# Patient Record
Sex: MALE | Race: WHITE | Employment: FULL TIME | URBAN - METROPOLITAN AREA
[De-identification: names, ages, dates, MRNs, and addresses within clinical notes are randomized per-mention and may not be internally consistent; named-entity substitution may affect disease eponyms.]

---

## 2017-09-28 ENCOUNTER — OFFICE VISIT (OUTPATIENT)
Dept: FAMILY MEDICINE | Facility: CLINIC | Age: 57
End: 2017-09-28
Payer: COMMERCIAL

## 2017-09-28 VITALS
HEIGHT: 73 IN | DIASTOLIC BLOOD PRESSURE: 81 MMHG | WEIGHT: 220 LBS | BODY MASS INDEX: 29.16 KG/M2 | SYSTOLIC BLOOD PRESSURE: 115 MMHG | RESPIRATION RATE: 18 BRPM | HEART RATE: 60 BPM | OXYGEN SATURATION: 97 % | TEMPERATURE: 98 F

## 2017-09-28 DIAGNOSIS — Z00.00 ROUTINE GENERAL MEDICAL EXAMINATION AT A HEALTH CARE FACILITY: Primary | ICD-10-CM

## 2017-09-28 DIAGNOSIS — Z12.5 SCREENING FOR PROSTATE CANCER: ICD-10-CM

## 2017-09-28 DIAGNOSIS — R73.01 IMPAIRED FASTING GLUCOSE: ICD-10-CM

## 2017-09-28 DIAGNOSIS — E66.3 OVERWEIGHT: ICD-10-CM

## 2017-09-28 LAB
ALBUMIN SERPL-MCNC: 4.1 G/DL (ref 3.4–5)
ALP SERPL-CCNC: 74 U/L (ref 40–150)
ALT SERPL W P-5'-P-CCNC: 55 U/L (ref 0–70)
ANION GAP SERPL CALCULATED.3IONS-SCNC: 9 MMOL/L (ref 3–14)
AST SERPL W P-5'-P-CCNC: 39 U/L (ref 0–45)
BILIRUB SERPL-MCNC: 0.9 MG/DL (ref 0.2–1.3)
BUN SERPL-MCNC: 22 MG/DL (ref 7–30)
CALCIUM SERPL-MCNC: 9.1 MG/DL (ref 8.5–10.1)
CHLORIDE SERPL-SCNC: 104 MMOL/L (ref 94–109)
CHOLEST SERPL-MCNC: 170 MG/DL
CO2 SERPL-SCNC: 25 MMOL/L (ref 20–32)
CREAT SERPL-MCNC: 0.77 MG/DL (ref 0.66–1.25)
ERYTHROCYTE [DISTWIDTH] IN BLOOD BY AUTOMATED COUNT: 12.4 % (ref 10–15)
GFR SERPL CREATININE-BSD FRML MDRD: >90 ML/MIN/1.7M2
GLUCOSE SERPL-MCNC: 244 MG/DL (ref 70–99)
HCT VFR BLD AUTO: 44.2 % (ref 40–53)
HDLC SERPL-MCNC: 44 MG/DL
HGB BLD-MCNC: 16 G/DL (ref 13.3–17.7)
LDLC SERPL CALC-MCNC: 103 MG/DL
MCH RBC QN AUTO: 32.4 PG (ref 26.5–33)
MCHC RBC AUTO-ENTMCNC: 36.2 G/DL (ref 31.5–36.5)
MCV RBC AUTO: 90 FL (ref 78–100)
NONHDLC SERPL-MCNC: 126 MG/DL
PLATELET # BLD AUTO: 174 10E9/L (ref 150–450)
POTASSIUM SERPL-SCNC: 4.1 MMOL/L (ref 3.4–5.3)
PROT SERPL-MCNC: 7.3 G/DL (ref 6.8–8.8)
PSA SERPL-ACNC: 0.91 UG/L (ref 0–4)
RBC # BLD AUTO: 4.94 10E12/L (ref 4.4–5.9)
SODIUM SERPL-SCNC: 138 MMOL/L (ref 133–144)
TRIGL SERPL-MCNC: 117 MG/DL
WBC # BLD AUTO: 3.8 10E9/L (ref 4–11)

## 2017-09-28 PROCEDURE — 99396 PREV VISIT EST AGE 40-64: CPT | Performed by: INTERNAL MEDICINE

## 2017-09-28 PROCEDURE — 85027 COMPLETE CBC AUTOMATED: CPT | Performed by: INTERNAL MEDICINE

## 2017-09-28 PROCEDURE — G0103 PSA SCREENING: HCPCS | Performed by: INTERNAL MEDICINE

## 2017-09-28 PROCEDURE — 36415 COLL VENOUS BLD VENIPUNCTURE: CPT | Performed by: INTERNAL MEDICINE

## 2017-09-28 PROCEDURE — 80061 LIPID PANEL: CPT | Performed by: INTERNAL MEDICINE

## 2017-09-28 PROCEDURE — 80053 COMPREHEN METABOLIC PANEL: CPT | Performed by: INTERNAL MEDICINE

## 2017-09-28 NOTE — NURSING NOTE
"Chief Complaint   Patient presents with     Physical     Not Fasting       Initial /81 (BP Location: Right arm, Patient Position: Chair, Cuff Size: Adult Large)  Pulse 60  Temp 98  F (36.7  C) (Oral)  Resp 18  Ht 6' 1\" (1.854 m)  Wt 220 lb (99.8 kg)  SpO2 97%  BMI 29.03 kg/m2 Estimated body mass index is 29.03 kg/(m^2) as calculated from the following:    Height as of this encounter: 6' 1\" (1.854 m).    Weight as of this encounter: 220 lb (99.8 kg).  Medication Reconciliation: complete   Rylie Bennett CMA (AAMA)      "

## 2017-09-28 NOTE — PROGRESS NOTES
SUBJECTIVE:   CC: Joey Santillan is an 57 year old male who presents for preventative health visit.     Physical   Annual:     Getting at least 3 servings of Calcium per day::  Yes    Bi-annual eye exam::  NO    Dental care twice a year::  Yes    Sleep apnea or symptoms of sleep apnea::  None    Diet::  Regular (no restrictions)    Frequency of exercise::  6-7 days/week    Duration of exercise::  Less than 15 minutes    Taking medications regularly::  Not Applicable    Additional concerns today::  No        Today's PHQ-2 Score:   PHQ-2 ( 1999 [employer]) 9/28/2017   Q1: Little interest or pleasure in doing things 0   Q2: Feeling down, depressed or hopeless 0   PHQ-2 Score 0   Q1: Little interest or pleasure in doing things -   Q2: Feeling down, depressed or hopeless -   PHQ-2 Score -       Abuse: Current or Past(Physical, Sexual or Emotional)- No  Do you feel safe in your environment - Yes    Social History   Substance Use Topics     Smoking status: Current Some Day Smoker     Types: Cigars     Smokeless tobacco: Never Used      Comment: Cigar twice a month     Alcohol use Yes      Comment: 0-4 drinks per week     The patient does not drink >3 drinks per day nor >7 drinks per week.    Last PSA:   PSA   Date Value Ref Range Status   08/31/2015 0.88 0 - 4 ug/L Final       Reviewed orders with patient. Reviewed health maintenance and updated orders accordingly - Yes  Patient Active Problem List   Diagnosis     CARDIOVASCULAR SCREENING; LDL GOAL LESS THAN 160     Impaired fasting glucose     Overweight     History reviewed. No pertinent surgical history.    Social History   Substance Use Topics     Smoking status: Current Some Day Smoker     Types: Cigars     Smokeless tobacco: Never Used      Comment: Cigar twice a month     Alcohol use Yes      Comment: 0-4 drinks per week     Family History   Problem Relation Age of Onset     C.A.D. Father      DIABETES Mother      Alzheimer Disease Paternal Grandmother       "Arthritis Maternal Grandmother      Cancer - colorectal No family hx of      Prostate Cancer No family hx of          Current Outpatient Prescriptions   Medication Sig Dispense Refill     nystatin (MYCOSTATIN) 823930 UNIT/GM POWD Apply 2 times per day for 2 weeks then as needed (Patient not taking: Reported on 9/28/2017) 30 g 1     No Known Allergies      Reviewed and updated as needed this visit by clinical staffTobacco  Allergies  Meds  Med Hx  Surg Hx  Fam Hx  Soc Hx        Reviewed and updated as needed this visit by Provider        Past Medical History:   Diagnosis Date     Pilonidal cyst       History reviewed. No pertinent surgical history.    ROS:  A 12 organ systems ROS is negative    OBJECTIVE:   /81 (BP Location: Right arm, Patient Position: Chair, Cuff Size: Adult Large)  Pulse 60  Temp 98  F (36.7  C) (Oral)  Resp 18  Ht 6' 1\" (1.854 m)  Wt 220 lb (99.8 kg)  SpO2 97%  BMI 29.03 kg/m2    EXAM:  GENERAL: healthy, alert and no distress  EYES: Eyes grossly normal to inspection, PERRL and conjunctivae and sclerae normal  HENT: ear canals and TM's normal, nose and mouth without ulcers or lesions  NECK: no adenopathy, no asymmetry, masses, or scars and thyroid normal to palpation  RESP: lungs clear to auscultation - no rales, rhonchi or wheezes  CV: regular rate and rhythm, normal S1 S2, no S3 or S4, no murmur, click or rub, no peripheral edema and peripheral pulses strong  ABDOMEN: soft, nontender, no hepatosplenomegaly, no masses and bowel sounds normal  RECTAL: normal sphincter tone, no rectal masses, prostate normal size, smooth, nontender without nodules or masses  MS: no gross musculoskeletal defects noted, no edema  SKIN: no suspicious lesions or rashes  NEURO: Normal strength and tone, mentation intact and speech normal  PSYCH: mentation appears normal, affect normal/bright    ASSESSMENT/PLAN:   1. Routine general medical examination at a health care facility    - Comprehensive " "metabolic panel  - CBC with platelets  - Lipid panel reflex to direct LDL    2. Screening for prostate cancer    - Prostate spec antigen screen    3. Overweight  Counseled on diet and exercise interventions to promote weight loss       COUNSELING:   Reviewed preventive health counseling, as reflected in patient instructions  Special attention given to:        Regular exercise       Healthy diet/nutrition       Immunizations    Declined: Influenza due to Concerns about side effects/safety             Consider Hep C screening for patients born between 1945 and 1965       Colon cancer screening       Prostate cancer screening             reports that he has been smoking Cigars.  He has never used smokeless tobacco.      Estimated body mass index is 29.03 kg/(m^2) as calculated from the following:    Height as of this encounter: 6' 1\" (1.854 m).    Weight as of this encounter: 220 lb (99.8 kg).   Weight management plan: Discussed healthy diet and exercise guidelines and patient will follow up in 12 months in clinic to re-evaluate.    Counseling Resources:  ATP IV Guidelines  Pooled Cohorts Equation Calculator  FRAX Risk Assessment  ICSI Preventive Guidelines  Dietary Guidelines for Americans, 2010  USDA's MyPlate  ASA Prophylaxis  Lung CA Screening    Narciso Cisneros MD  [facility]  Answers for HPI/ROS submitted by the patient on 9/25/2017   PHQ-2 Score: 0    "

## 2017-09-28 NOTE — MR AVS SNAPSHOT
After Visit Summary   9/28/2017    Joey Santillan    MRN: [id number]           Patient Information     Date Of Birth          1960        Visit Information        Provider Department      9/28/2017 10:00 AM Narciso Cisneros MD [facility]        Today's Diagnoses     Routine general medical examination at a health care facility    -  1    Screening for prostate cancer        Overweight          Care Instructions      Preventive Health Recommendations  Male Ages 50 - 64    Yearly exam:             See your health care provider every year in order to  o   Review health changes.   o   Discuss preventive care.    o   Review your medicines if your doctor has prescribed any.     Have a cholesterol test every 5 years, or more frequently if you are at risk for high cholesterol/heart disease.     Have a diabetes test (fasting glucose) every three years. If you are at risk for diabetes, you should have this test more often.     Have a colonoscopy at age 50, or have a yearly FIT test (stool test). These exams will check for colon cancer.      Talk with your health care provider about whether or not a prostate cancer screening test (PSA) is right for you.    You should be tested each year for STDs (sexually transmitted diseases), if you re at risk.     Shots: Get a flu shot each year. Get a tetanus shot every 10 years.     Nutrition:    Eat at least 5 servings of fruits and vegetables daily.     Eat whole-grain bread, whole-wheat pasta and brown rice instead of white grains and rice.     Talk to your provider about Calcium and Vitamin D.     Lifestyle    Exercise for at least 150 minutes a week (30 minutes a day, 5 days a week). This will help you control your weight and prevent disease.     Limit alcohol to one drink per day.     No smoking.     Wear sunscreen to prevent skin cancer.     See your dentist every six months for an exam and cleaning.     See your eye doctor every 1 to 2  "years.            Follow-ups after your visit        Follow-up notes from your care team     Return in about 1 year (around 9/28/2018) for Physical Exam.      Who to contact     If you have questions or need follow up information about today's clinic visit or your schedule please contact [facility] directly at [phone].  Normal or non-critical lab and imaging results will be communicated to you by MyChart, letter or phone within 4 business days after the clinic has received the results. If you do not hear from us within 7 days, please contact the clinic through [employer] or phone. If you have a critical or abnormal lab result, we will notify you by phone as soon as possible.  Submit refill requests through [employer] or call your pharmacy and they will forward the refill request to us. Please allow 3 business days for your refill to be completed.          Additional Information About Your Visit        [employer] Information     [employer] gives you secure access to your electronic health record. If you see a primary care provider, you can also send messages to your care team and make appointments. If you have questions, please call your primary care clinic.  If you do not have a primary care provider, please call [phone] and they will assist you.        Care EveryWhere ID     This is your Care EveryWhere ID. This could be used by other organizations to access your [facility] medical records  [id number]        Your Vitals Were     Pulse Temperature Respirations Height Pulse Oximetry BMI (Body Mass Index)    60 98  F (36.7  C) (Oral) 18 6' 1\" (1.854 m) 97% 29.03 kg/m2       Blood Pressure from Last 3 Encounters:   09/28/17 115/81   08/31/15 118/80   02/13/12 108/69    Weight from Last 3 Encounters:   09/28/17 220 lb (99.8 kg)   08/31/15 216 lb (98 kg)   02/13/12 206 lb (93.4 kg)              We Performed the Following     CBC with platelets     Comprehensive metabolic panel     Lipid panel reflex to direct " LDL     Prostate spec antigen screen          Today's Medication Changes          These changes are accurate as of: 9/28/17 10:36 AM.  If you have any questions, ask your nurse or doctor.               Stop taking these medicines if you haven't already. Please contact your care team if you have questions.     NO ACTIVE MEDICATIONS   Stopped by:  Narciso Cisneros MD                    Primary Care Provider Office Phone # Fax #    Narciso Cisneros [phone]       [facility] [address] [place] [address]  [facility] [address]        Equal Access to Services     [facility]: Hadii aad ku hadasho Soomaali, waaxda luqadaha, qaybta kaalmada adeegyada, waxay idiin hayaan adeeg kharageri jang . So [facility] [phone].    ATENCIÓN: Si habla español, tiene a montesinos disposición servicios gratuitos de asistencia lingüística. [facility] [phone].    We comply with applicable federal civil rights laws and [place] laws. We do not discriminate on the basis of race, color, national origin, age, disability sex, sexual orientation or gender identity.            Thank you!     Thank you for choosing [facility]  for your care. Our goal is always to provide you with excellent care. Hearing back from our patients is one way we can continue to improve our services. Please take a few minutes to complete the written survey that you may receive in the mail after your visit with us. Thank you!             Your Updated Medication List - Protect others around you: Learn how to safely use, store and throw away your medicines at www.disposemymeds.org.          This list is accurate as of: 9/28/17 10:36 AM.  Always use your most recent med list.                   Brand Name Dispense Instructions for use Diagnosis    nystatin 503015 UNIT/GM Powd    MYCOSTATIN    30 g    Apply 2 times per day for 2 weeks then as needed    Intertrigo

## 2017-09-28 NOTE — LETTER
[facility]  [address]  [address]  [place]  [address]  Tel: [phone]    October 2, 2017    Joey Santillan  [address] [facility] [address]        Dear Mr. Santillan,    The following letter pertains to your most recent diagnostic tests:    -Your prostate specific antigen (PSA) test result returned normal.     -Liver and gallbladder tests are normal for you. (ALT,AST, Alk phos, bilirubin), kidney function is normal for you (Creatinine, GFR), Sodium is normal, Potassium is normal for you, Calcium is normal for you.    -Your Glucose (blood sugar) is markedly elevated and in the DIABETIC RANGE.      -Your cholesterol panel looks OK.    -Your complete blood counts including your hemoglobin returned normal for you.         Bottom line:  I am afraid your blood sugar is indicative of type 2 diabetes, but I would like you to return to the lab for a confirmatory test called the hemoglobin A1c.  The hemoglobin A1c blood test will confirm the presence of diabetes and indicate the severity and whether medications or just diet changes are needed to manage the diabetes.  If the hemoglobin A1c is high, a good first step would be to arrange a basic diabetes education session in our clinic to help you understand the diagnosis and how to improve your diet to help manage the condition.        Follow up:  Return to the lab for a hemoglobin A1c blood test.  You can schedule a lab appointment for that purpose.    If you have any further questions or problems, please contact our office.      Sincerely,    Narciso Cisneros MD/perla     Enclosure: Lab Results  Results for orders placed or performed in visit on 09/28/17   Comprehensive metabolic panel   Result Value Ref Range    Sodium 138 133 - 144 mmol/L    Potassium 4.1 3.4 - 5.3 mmol/L    Chloride 104 94 - 109 mmol/L    Carbon Dioxide 25 20 - 32 mmol/L    Anion Gap 9 3 - 14 mmol/L    Glucose 244 (H) 70 - 99 mg/dL    Urea Nitrogen 22 7 - 30 mg/dL     Creatinine 0.77 0.66 - 1.25 mg/dL    GFR Estimate >90 >60 mL/min/1.7m2    GFR Estimate If Black >90 >60 mL/min/1.7m2    Calcium 9.1 8.5 - 10.1 mg/dL    Bilirubin Total 0.9 0.2 - 1.3 mg/dL    Albumin 4.1 3.4 - 5.0 g/dL    Protein Total 7.3 6.8 - 8.8 g/dL    Alkaline Phosphatase 74 40 - 150 U/L    ALT 55 0 - 70 U/L    AST 39 0 - 45 U/L   CBC with platelets   Result Value Ref Range    WBC 3.8 (L) 4.0 - 11.0 10e9/L    RBC Count 4.94 4.4 - 5.9 10e12/L    Hemoglobin 16.0 13.3 - 17.7 g/dL    Hematocrit 44.2 40.0 - 53.0 %    MCV 90 78 - 100 fl    MCH 32.4 26.5 - 33.0 pg    MCHC 36.2 31.5 - 36.5 g/dL    RDW 12.4 10.0 - 15.0 %    Platelet Count 174 150 - 450 10e9/L   Lipid panel reflex to direct LDL   Result Value Ref Range    Cholesterol 170 <200 mg/dL    Triglycerides 117 <150 mg/dL    HDL Cholesterol 44 >39 mg/dL    LDL Cholesterol Calculated 103 (H) <100 mg/dL    Non HDL Cholesterol 126 <130 mg/dL   Prostate spec antigen screen   Result Value Ref Range    PSA 0.91 0 - 4 ug/L

## 2017-10-01 NOTE — PROGRESS NOTES
The following letter pertains to your most recent diagnostic tests:    -Your prostate specific antigen (PSA) test result returned normal.     -Liver and gallbladder tests are normal for you. (ALT,AST, Alk phos, bilirubin), kidney function is normal for you (Creatinine, GFR), Sodium is normal, Potassium is normal for you, Calcium is normal for you.    -Your Glucose (blood sugar) is markedly elevated and in the DIABETIC RANGE.      -Your cholesterol panel looks OK.    -Your complete blood counts including your hemoglobin returned normal for you.         Bottom line:  I am afraid your blood sugar is indicative of type 2 diabetes, but I would like you to return to the lab for a confirmatory test called the hemoglobin A1c.  The hemoglobin A1c blood test will confirm the presence of diabetes and indicate the severity and whether medications or just diet changes are needed to manage the diabetes.  If the hemoglobin A1c is high, a good first step would be to arrange a basic diabetes education session in our clinic to help you understand the diagnosis and how to improve your diet to help manage the condition.        Follow up:  Return to the lab for a hemoglobin A1c blood test.  You can schedule a lab appointment for that purpose.        Sincerely,    Dr. Cisneros

## 2017-10-01 NOTE — PROGRESS NOTES
Can you call him and make sure he got my [facility] message re his blood sugar and that he comes back for an A1c

## 2017-10-16 DIAGNOSIS — E11.9 TYPE 2 DIABETES MELLITUS WITHOUT COMPLICATION, WITHOUT LONG-TERM CURRENT USE OF INSULIN (H): Primary | ICD-10-CM

## 2017-10-16 DIAGNOSIS — R73.01 IMPAIRED FASTING GLUCOSE: ICD-10-CM

## 2017-10-16 LAB — HBA1C MFR BLD: 9.2 % (ref 4.3–6)

## 2017-10-16 PROCEDURE — 36415 COLL VENOUS BLD VENIPUNCTURE: CPT | Performed by: INTERNAL MEDICINE

## 2017-10-16 PROCEDURE — 83036 HEMOGLOBIN GLYCOSYLATED A1C: CPT | Performed by: INTERNAL MEDICINE

## 2017-10-17 ENCOUNTER — TELEPHONE (OUTPATIENT)
Dept: FAMILY MEDICINE | Facility: CLINIC | Age: 57
End: 2017-10-17

## 2017-10-17 NOTE — PROGRESS NOTES
The following letter pertains to your most recent diagnostic tests:    -Your hemoglobin A1c which is a diabetes blood test that represents an average of you blood sugars over the last 3 months returned at 9.2.  This is above your goal of hemoglobin A1c less than 7 and confirms the diagnosis of TYPE 2 DIABETES.      I tried to call you with this information, but did not get an answer.  Please update you contact information with out  if it has changed.    You need to start a medication called metformin to get your blood sugars down.  I have sent an prescritpion for this medication to your pharmacy.  Start taking the medication with breakfast and dinner as soon as you can.      I recommend that you meet with a diabetes educator to learn more about your new diagnosis.  Call [phone] to schedule an appointment for a diabetes education session.      Follow up:  You will need to follow up with me in 3 months to recheck your hemoglobin A1c test.   You may want to schedule a lab appointment for the A1c test to be drawn prior to our visit so we can have the result available to discuss at the time of our visit.         Sincerely,    Dr. Cisneros

## 2017-10-23 ENCOUNTER — OFFICE VISIT (OUTPATIENT)
Dept: FAMILY MEDICINE | Facility: CLINIC | Age: 57
End: 2017-10-23
Payer: COMMERCIAL

## 2017-10-23 VITALS
BODY MASS INDEX: 29.16 KG/M2 | TEMPERATURE: 97.8 F | WEIGHT: 220 LBS | HEIGHT: 73 IN | HEART RATE: 68 BPM | RESPIRATION RATE: 18 BRPM | DIASTOLIC BLOOD PRESSURE: 73 MMHG | OXYGEN SATURATION: 98 % | SYSTOLIC BLOOD PRESSURE: 105 MMHG

## 2017-10-23 DIAGNOSIS — Z23 NEED FOR PROPHYLACTIC VACCINATION AND INOCULATION AGAINST INFLUENZA: ICD-10-CM

## 2017-10-23 DIAGNOSIS — Z13.89 SCREENING FOR DIABETIC PERIPHERAL NEUROPATHY: ICD-10-CM

## 2017-10-23 DIAGNOSIS — E78.5 HYPERLIPIDEMIA LDL GOAL <100: ICD-10-CM

## 2017-10-23 DIAGNOSIS — Z23 NEED FOR PROPHYLACTIC VACCINATION AGAINST STREPTOCOCCUS PNEUMONIAE (PNEUMOCOCCUS): ICD-10-CM

## 2017-10-23 DIAGNOSIS — E11.9 TYPE 2 DIABETES MELLITUS WITHOUT COMPLICATION, WITHOUT LONG-TERM CURRENT USE OF INSULIN (H): Primary | ICD-10-CM

## 2017-10-23 LAB
CREAT UR-MCNC: 148 MG/DL
MICROALBUMIN UR-MCNC: 8 MG/L
MICROALBUMIN/CREAT UR: 5.18 MG/G CR (ref 0–17)

## 2017-10-23 PROCEDURE — 99207 C FOOT EXAM  NO CHARGE: CPT | Performed by: INTERNAL MEDICINE

## 2017-10-23 PROCEDURE — 82043 UR ALBUMIN QUANTITATIVE: CPT | Performed by: INTERNAL MEDICINE

## 2017-10-23 PROCEDURE — 99214 OFFICE O/P EST MOD 30 MIN: CPT | Performed by: INTERNAL MEDICINE

## 2017-10-23 NOTE — PROGRESS NOTES
"  SUBJECTIVE:                                                    Joey Santillan is a 57 year old male who presents to clinic today for the following health issues:      New diagnosis of type 2 diabetes    Here with concerns about management of new diagnosis of type 2 diabetes.  He has concerns about taking metformin  He wants to maximize lifestyle interverntions  He has many questions about diet  He has many questions about complications of diabetes  He has no new symptoms  He has not started metformin  He has many questions about risks and side effects of metformin     Problem list and histories reviewed & adjusted, as indicated.  Additional history: as documented    Patient Active Problem List   Diagnosis     Overweight     History reviewed. No pertinent surgical history.    Social History   Substance Use Topics     Smoking status: Current Some Day Smoker     Types: Cigars     Smokeless tobacco: Never Used      Comment: Cigar twice a month     Alcohol use Yes      Comment: 0-4 drinks per week     Family History   Problem Relation Age of Onset     C.A.D. Father      DIABETES Mother      Alzheimer Disease Paternal Grandmother      Arthritis Maternal Grandmother      Cancer - colorectal No family hx of      Prostate Cancer No family hx of          Current Outpatient Prescriptions   Medication Sig Dispense Refill     aspirin 81 MG tablet Take 1 tablet (81 mg) by mouth daily 90 tablet 3     nystatin (MYCOSTATIN) 054067 UNIT/GM POWD Apply 2 times per day for 2 weeks then as needed 30 g 1     metFORMIN (GLUCOPHAGE) 500 MG tablet Take 1 tablet (500 mg) by mouth 2 times daily (with meals) (Patient not taking: Reported on 10/23/2017) 180 tablet 3     No Known Allergies      OBJECTIVE:     /73 (BP Location: Right arm, Patient Position: Chair, Cuff Size: Adult Large)  Pulse 68  Temp 97.8  F (36.6  C) (Oral)  Resp 18  Ht 6' 1\" (1.854 m)  Wt 220 lb (99.8 kg)  SpO2 98%  BMI 29.03 kg/m2  Body mass index is " 29.03 kg/(m^2).  GENERAL: healthy, alert and no distress  MS: no gross musculoskeletal defects noted, no edema  NEURO: Normal strength and tone, mentation intact and speech normal  PSYCH: mentation appears normal, affect normal/bright  Diabetic foot exam: normal DP and PT pulses, no trophic changes or ulcerative lesions and normal sensory exam    Diagnostic Test Results:  Results for orders placed or performed in visit on 10/16/17   **A1C FUTURE anytime   Result Value Ref Range    Hemoglobin A1C 9.2 (H) 4.3 - 6.0 %       ASSESSMENT/PLAN:       1. Type 2 diabetes mellitus without complication, without long-term current use of insulin (H)  I spent more that 25 minutes with him mostly counseling on new diagnosis   We discussed importance of annual eye exams  We discussed possibly kidney complications and need for microalbumin screening and possible ACE/ARB therapy  We discussed increase risk of CV disease and rationale for daily baby aspirin 81mg   I recommended starting metformin, we discussed the risks and side effects, but I think the benefits outweigh the risks in his case  We discussed the need to return after 1/16/18 for A1c recheck and that our goal is to get A1c less than 7     - Albumin Random Urine Quantitative with Creat Ratio  - aspirin 81 MG tablet; Take 1 tablet (81 mg) by mouth daily  Dispense: 90 tablet; Refill: 3  - OPHTHALMOLOGY ADULT REFERRAL  - **A1C FUTURE 3mo; Future  - ACE/ARB/ARNI NOT PRESCRIBED, INTENTIONAL,; Please choose reason not prescribed, below    2. Hyperlipidemia LDL goal <100  His LDL is slightly above goal as of last check  He is planning diet changes  He is feeling a little overwhelmed about starting medications and did not want to entertain the notion of statin therapy for primary prevention as this visit     3. Screening for diabetic peripheral neuropathy    - FOOT EXAM  NO CHARGE [71259.114]    4. Need for prophylactic vaccination against Streptococcus pneumoniae  (pneumococcus)  Will vaccinate at future visits     5. Need for prophylactic vaccination and inoculation against influenza  He declines my recommendation for flu shot today       FUTURE APPOINTMENTS:       - Follow-up visit in 1/2018    Narciso Cisneros MD  [facility]

## 2017-10-23 NOTE — MR AVS SNAPSHOT
After Visit Summary   10/23/2017    Joey Santillan    MRN: [id number]           Patient Information     Date Of Birth          1960        Visit Information        Provider Department      10/23/2017 11:00 AM Narciso Cisneros MD [facility]        Today's Diagnoses     Type 2 diabetes mellitus without complication, without long-term current use of insulin (H)    -  1    Screen for colon cancer        Screening for diabetic peripheral neuropathy        Need for prophylactic vaccination against Streptococcus pneumoniae (pneumococcus)        Need for prophylactic vaccination and inoculation against influenza           Follow-ups after your visit        Additional Services     OPHTHALMOLOGY ADULT REFERRAL       Your provider has referred you to: [facility], [facility] [phone]   [email or web address]    Dr. Reis Walcott  Dr. Keya Rodriguez    Please be aware that coverage of these services is subject to the terms and limitations of your health insurance plan.  Call member services at your health plan with any benefit or coverage questions.      Please bring the following with you to your appointment:    (1) Any X-Rays, CTs or MRIs which have been performed.  Contact the facility where they were done to arrange for  prior to your scheduled appointment.    (2) List of current medications  (3) This referral request   (4) Any documents/labs given to you for this referral                  Follow-up notes from your care team     Return for lab appt for A1c followed by office visit with Dr. Cisneros in 3 months.      Future tests that were ordered for you today     Open Future Orders        Priority Expected Expires Ordered    **A1C FUTURE 3mo Routine 1/21/2018 2/20/2018 10/23/2017            Who to contact     If you have questions or need follow up information about today's clinic  "visit or your schedule please contact [facility] directly at [phone].  Normal or non-critical lab and imaging results will be communicated to you by [employer], letter or phone within 4 business days after the clinic has received the results. If you do not hear from us within 7 days, please contact the clinic through [employer] or phone. If you have a critical or abnormal lab result, we will notify you by phone as soon as possible.  Submit refill requests through [employer] or call your pharmacy and they will forward the refill request to us. Please allow 3 business days for your refill to be completed.          Additional Information About Your Visit        [employer] Information     [employer] gives you secure access to your electronic health record. If you see a primary care provider, you can also send messages to your care team and make appointments. If you have questions, please call your primary care clinic.  If you do not have a primary care provider, please call [phone] and they will assist you.        Care EveryWhere ID     This is your Care EveryWhere ID. This could be used by other organizations to access your [place] medical records  [id number]        Your Vitals Were     Pulse Temperature Respirations Height Pulse Oximetry BMI (Body Mass Index)    68 97.8  F (36.6  C) (Oral) 18 6' 1\" (1.854 m) 98% 29.03 kg/m2       Blood Pressure from Last 3 Encounters:   10/23/17 105/73   09/28/17 115/81   08/31/15 118/80    Weight from Last 3 Encounters:   10/23/17 220 lb (99.8 kg)   09/28/17 220 lb (99.8 kg)   08/31/15 216 lb (98 kg)              We Performed the Following     Albumin Random Urine Quantitative with Creat Ratio     FOOT EXAM  NO CHARGE [62599.114]     OPHTHALMOLOGY ADULT REFERRAL          Today's Medication Changes          These changes are accurate as of: 10/23/17 11:36 AM.  If you have any questions, ask your nurse or doctor.               Start taking these medicines.        " Dose/Directions    aspirin 81 MG tablet   Used for:  Type 2 diabetes mellitus without complication, without long-term current use of insulin (H)   Started by:  Narciso Cisneros MD        Dose:  81 mg   Take 1 tablet (81 mg) by mouth daily   Quantity:  90 tablet   Refills:  3            Where to get your medicines      These medications were sent to [id number] - [place], [place] - [address]  [address] [address]     Phone:  [phone]     aspirin 81 MG tablet                Primary Care Provider Office Phone # Fax #    Narciso Cisneros [phone]       [facility] [address]  [facility] [address]        Equal Access to Services     FAUSTINO SALGADO : Hadii gregg burnham hadasho Soomaali, waaxda luqadaha, qaybta kaalmada adeegyada, elsa jang . So [facility] [phone].    ATENCIÓN: Si habla español, tiene a montesinos disposición servicios gratuitos de asistencia lingüística. Llame al [phone].    We comply with applicable federal civil rights laws and [place] laws. We do not discriminate on the basis of race, color, national origin, age, disability, sex, sexual orientation, or gender identity.            Thank you!     Thank you for choosing [facility]  for your care. Our goal is always to provide you with excellent care. Hearing back from our patients is one way we can continue to improve our services. Please take a few minutes to complete the written survey that you may receive in the mail after your visit with us. Thank you!             Your Updated Medication List - Protect others around you: Learn how to safely use, store and throw away your medicines at www.disposemymeds.org.          This list is accurate as of: 10/23/17 11:36 AM.  Always use your most recent med list.                   Brand Name Dispense Instructions for use Diagnosis    aspirin 81 MG tablet     90 tablet    Take 1 tablet (81 mg) by mouth  daily    Type 2 diabetes mellitus without complication, without long-term current use of insulin (H)       metFORMIN 500 MG tablet    GLUCOPHAGE    180 tablet    Take 1 tablet (500 mg) by mouth 2 times daily (with meals)    Type 2 diabetes mellitus without complication, without long-term current use of insulin (H)       nystatin 347173 UNIT/GM Powd    MYCOSTATIN    30 g    Apply 2 times per day for 2 weeks then as needed    Intertrigo

## 2017-10-23 NOTE — LETTER
[facility]  [address]  [address]  [place]  [address]  Tel: [phone]    October 24, 2017    Joey Bender Tyrell  [address] [address]        Dear Mr. Santillan,    The following letter pertains to your most recent diagnostic tests:    Good news! -Your microalbumin level which is a diabetes urine test to check for any evidence of early kidney injury from diabetes returned negative.     If you have any further questions or problems, please contact our office.      Sincerely,    Narciso Cisneros MD/MIKAL          Enclosure: Lab Results  Results for orders placed or performed in visit on 10/23/17   Albumin Random Urine Quantitative with Creat Ratio   Result Value Ref Range    Creatinine Urine 148 mg/dL    Albumin Urine mg/L 8 mg/L    Albumin Urine mg/g Cr 5.18 0 - 17 mg/g Cr

## 2017-10-23 NOTE — NURSING NOTE
"Chief Complaint   Patient presents with     Diabetes     New Diagnosis-Has not started Metformin Yet       Initial /73 (BP Location: Right arm, Patient Position: Chair, Cuff Size: Adult Large)  Pulse 68  Temp 97.8  F (36.6  C) (Oral)  Resp 18  Ht 6' 1\" (1.854 m)  Wt 220 lb (99.8 kg)  SpO2 98%  BMI 29.03 kg/m2 Estimated body mass index is 29.03 kg/(m^2) as calculated from the following:    Height as of this encounter: 6' 1\" (1.854 m).    Weight as of this encounter: 220 lb (99.8 kg).  Medication Reconciliation: complete   Rylie Bennett CMA (AAMA)      "

## 2017-10-24 NOTE — PROGRESS NOTES
The following letter pertains to your most recent diagnostic tests:    Good news! -Your microalbumin level which is a diabetes urine test to check for any evidence of early kidney injury from diabetes returned negative.       Sincerely,    Dr. Cisneros

## 2017-10-31 ENCOUNTER — ALLIED HEALTH/NURSE VISIT (OUTPATIENT)
Dept: EDUCATION SERVICES | Facility: CLINIC | Age: 57
End: 2017-10-31
Payer: COMMERCIAL

## 2017-10-31 DIAGNOSIS — E11.9 TYPE 2 DIABETES MELLITUS WITHOUT COMPLICATION, WITHOUT LONG-TERM CURRENT USE OF INSULIN (H): Primary | ICD-10-CM

## 2017-10-31 PROCEDURE — G0108 DIAB MANAGE TRN  PER INDIV: HCPCS

## 2017-10-31 NOTE — MR AVS SNAPSHOT
After Visit Summary   10/31/2017    Joey Santillan    MRN: [id number]           Patient Information     Date Of Birth          1960        Visit Information        Provider Department      10/31/2017 1:30 PM  DIABETIC ED RESOURCE [facility]        Today's Diagnoses     Type 2 diabetes mellitus without complication, without long-term current use of insulin (H)    -  1      Care Instructions    My Diabetes Care Goals:    Monitoring: check blood sugar once daily.    Goal for blood sugar is  in the morning before breakfast.     Follow up:  Call ([phone]), e-mail ([email or web address]), or send ContentRealtimet message with questions, concerns or if follow-up is needed.     Bring blood glucose meter and logbook with you to all doctor and follow-up appointments.     [place] [facility] for the [facility]:  For Your Diabetes Education and Nutrition Appointments Call:  [phone]   For Diabetes Education or Nutrition Related Questions:   Phone: [phone]  E-mail: [email or web address]  Fax: [phone]   If you need a medication refill please contact your pharmacy. Please allow 3 business days for your refills to be completed.    Instructions for emailing the Diabetes Educators    If you need to communicate a non-urgent message to a Diabetes Educator via email, please send to [email or web address].    Please follow the following email guidelines:    Subject line: Secure: your clinic name (example: Secure: [facility])  In the email please include: First name, middle initial, last name and date of birth.    We will be in touch with you within one (1) business day.             Follow-ups after your visit        Your next 10 appointments already scheduled     Dec 14, 2017 10:30 AM CST   Diabetic Education with  DIABETIC ED RESOURCE   [facility] ([facility])    [address] [place] [address]  [id number] [phone]              Who to contact     If you have questions or need follow up information about today's clinic visit or your schedule please contact [facility] directly at [phone].  Normal or non-critical lab and imaging results will be communicated to you by MyChart, letter or phone within 4 business days after the clinic has received the results. If you do not hear from us within 7 days, please contact the clinic through [employer]hart or phone. If you have a critical or abnormal lab result, we will notify you by phone as soon as possible.  Submit refill requests through [employer] or call your pharmacy and they will forward the refill request to us. Please allow 3 business days for your refill to be completed.          Additional Information About Your Visit        [employer] Information     [employer] gives you secure access to your electronic health record. If you see a primary care provider, you can also send messages to your care team and make appointments. If you have questions, please call your primary care clinic.  If you do not have a primary care provider, please call [phone] and they will assist you.        Care EveryWhere ID     This is your Care EveryWhere ID. This could be used by other organizations to access your [place] medical records  [id number]         Blood Pressure from Last 3 Encounters:   10/23/17 105/73   09/28/17 115/81   08/31/15 118/80    Weight from Last 3 Encounters:   10/23/17 99.8 kg (220 lb)   09/28/17 99.8 kg (220 lb)   08/31/15 98 kg (216 lb)              Today, you had the following     No orders found for display       Primary Care Provider Office Phone # Fax #    Narciso Cisneros [phone]       [facility] [address]  [place] [address]        Equal Access to Services     JANELLE SALGADO AH: Hadii gregg burnham hadasho Soomaali, waaxda luqadaha, qaybta kaalmada adeegyajanes, elsa leblanc. So  [facility] [phone].    ATENCIÓN: Si mat cook, tiene a montesinos disposición servicios gratuitos de asistencia lingüística. Tim preciado [phone].    We comply with applicable federal civil rights laws and [place] laws. We do not discriminate on the basis of race, color, national origin, age, disability, sex, sexual orientation, or gender identity.            Thank you!     Thank you for choosing [facility]  for your care. Our goal is always to provide you with excellent care. Hearing back from our patients is one way we can continue to improve our services. Please take a few minutes to complete the written survey that you may receive in the mail after your visit with us. Thank you!             Your Updated Medication List - Protect others around you: Learn how to safely use, store and throw away your medicines at www.disposemymeds.org.          This list is accurate as of: 10/31/17  2:34 PM.  Always use your most recent med list.                   Brand Name Dispense Instructions for use Diagnosis    ACE/ARB/ARNI NOT PRESCRIBED (INTENTIONAL)      Please choose reason not prescribed, below    Type 2 diabetes mellitus without complication, without long-term current use of insulin (H)       aspirin 81 MG tablet     90 tablet    Take 1 tablet (81 mg) by mouth daily    Type 2 diabetes mellitus without complication, without long-term current use of insulin (H)       metFORMIN 500 MG tablet    GLUCOPHAGE    180 tablet    Take 1 tablet (500 mg) by mouth 2 times daily (with meals)    Type 2 diabetes mellitus without complication, without long-term current use of insulin (H)       nystatin [id number] UNIT/GM Powd    MYCOSTATIN    30 g    Apply 2 times per day for 2 weeks then as needed    Intertrigo

## 2017-10-31 NOTE — Clinical Note
Yancy, saw pt today for new dx education. Thank you for the referral! Will see him again in 6 weeks.  Natasha Garcia RD LD CDE

## 2017-10-31 NOTE — PATIENT INSTRUCTIONS
My Diabetes Care Goals:    Monitoring: check blood sugar once daily.    Goal for blood sugar is  in the morning before breakfast.     Follow up:  Call [phone]), e-mail ([email or web address]), or send Dragonfly Systemst message with questions, concerns or if follow-up is needed.     Bring blood glucose meter and logbook with you to all doctor and follow-up appointments.     [place] [facility] for the [facility] Area:  For Your Diabetes Education and Nutrition Appointments Call:  [phone]   For Diabetes Education or Nutrition Related Questions:   Phone: [phone]  E-mail: [email or web address]  Fax: [phone]   If you need a medication refill please contact your pharmacy. Please allow 3 business days for your refills to be completed.    Instructions for emailing the Diabetes Educators    If you need to communicate a non-urgent message to a Diabetes Educator via email, please send to [email or web address].    Please follow the following email guidelines:    Subject line: Secure: your clinic name (example: Secure: [place])  In the email please include: First name, middle initial, last name and date of birth.    We will be in touch with you within one (1) business day.

## 2017-10-31 NOTE — PROGRESS NOTES
Diabetes Self Management Training: Initial Assessment Visit for Newly Diagnosed Patients (Complete AADE Goals Flowsheet)    Joey Santillan presents today for education related to Type 2 diabetes.    He is accompanied by self    Patient's diabetes management related comments/concerns: The foods to eat and avoid    Patient's emotional response to diabetes: expresses readiness to learn and anxiety    Patient would like this visit to be focused around the following diabetes-related behaviors and goals: Healthy Eating    ASSESSMENT:  Patient Problem List and Family Medical History reviewed for relevant medical history, current medical status, and diabetes risk factors.    Current Diabetes Management per Patient:  Taking diabetes medications?   yes:     Diabetes Medication(s)     Biguanides Sig    metFORMIN (GLUCOPHAGE) 500 MG tablet Take 1 tablet (500 mg) by mouth 2 times daily (with meals)     Patient not taking:  Reported on 10/23/2017      , Problems taking diabetes medications regularly? Is not taking as he would like to see if diet and lifestyle can help first. Reports his doctor is giving him a 3 month window to try this.      Past Diabetes Education: Newly diagnosed    Patient glucose self monitoring as follows: BG meter taught today.   BG meter: One Touch Verio meter  BG results: not available     BG values are: unable to assess  Patient's most recent   Lab Results   Component Value Date    A1C 9.2 10/16/2017    is not meeting goal of <7.0    Nutrition:  Patient has cut out sugar and has increased intake of legumes and greens and brown rice. Used to like sandwiches for lunch but now having salads.     Breakfast - 1 slice WW toast with almond butter and smoothie with apples, plain yogurt, spinach, OJ OR misses OR oatmeal and 1 tsp honey  Lunch - kale spinach salad with carrots and green olives, tomatoes, avocado and water OR brown rice with kale, black beans, green onion, salsa, refried beans (fat free) and  "water  Dinner - turkey meatball with pasta sauce and salad (oil vinegar dressing) and water   Snacks - nuts (almonds, seeds, walnuts)    Beverages: water, 2 beers on occasion, unsweetened tea    Cultural/Moravian diet restrictions: No     Biggest Challenge to Healthy Eating: knowing what to eat    Physical Activity:    [facility]    Diabetes Risk Factors:  age over 45 years, hyperlipidemia, overweight/obesity and inactivity    Diabetes Complications:  Not discussed today.    Vitals:  There were no vitals taken for this visit.  Estimated body mass index is 29.03 kg/(m^2) as calculated from the following:    Height as of 10/23/17: 1.854 m (6' 1\").    Weight as of 10/23/17: 99.8 kg (220 lb). --> has lost 8# so far and is 212#    Last 3 BP:   BP Readings from Last 3 Encounters:   10/23/17 105/73   09/28/17 115/81   08/31/15 118/80       History   Smoking Status     Current Some Day Smoker     Types: Cigars   Smokeless Tobacco     Never Used     Comment: Cigar twice a month       Labs:  Lab Results   Component Value Date    A1C 9.2 10/16/2017     Lab Results   Component Value Date     09/28/2017     Lab Results   Component Value Date     09/28/2017     HDL Cholesterol   Date Value Ref Range Status   09/28/2017 44 >39 mg/dL Final   ]  GFR Estimate   Date Value Ref Range Status   09/28/2017 >90 >60 mL/min/1.7m2 Final     Comment:     Non  GFR Calc     GFR Estimate If Black   Date Value Ref Range Status   09/28/2017 >90 >60 mL/min/1.7m2 Final     Comment:      GFR Calc     Lab Results   Component Value Date    CR 0.77 09/28/2017     No results found for: MICROALBUMIN    Socio/Economic Considerations:    Support system: spouse/significant other    Health Beliefs and Attitudes:   Patient Activation Measure Survey Score:  OMAIRA Score (Last Two) 9/26/2011   OMAIRA Raw Score 48   Activation Score 80   OMAIRA Level 4       Stage of Change: ACTION " (Actively working towards change)      Diabetes knowledge and skills assessment:     Patient is knowledgeable in diabetes management concepts related to: Healthy Eating    Patient needs further education on the following diabetes management concepts: Healthy Eating, Being Active, Monitoring, Problem Solving, Reducing Risks and Healthy Coping    Barriers to Learning Assessment: No Barriers identified    Based on learning assessment above, most appropriate setting for further diabetes education would be: Group class or Individual setting.    INTERVENTION:   Education provided today on:  AADE Self-Care Behaviors:  Healthy Eating: carbohydrate counting, consistency in amount, composition, and timing of food intake, weight reduction, heart healthy diet, portion control and label reading. Praised him on his weight loss and diet changes so far! Encouraged him to continue and focused on balance with his meals and choosing healthier foods.   Being Active: relationship to blood glucose  Monitoring: purpose, proper technique, log and interpret results, individual blood glucose targets, frequency of monitoring, use of glucose control solution and proper sharps disposal  Problem Solving: high blood glucose - causes, signs/symptoms, treatment and prevention  Healthy Coping: benefits of making appropriate lifestyle changes  Patient was instructed on One Touch Verio meter and was able to provide an accurate return demonstration. Patient's blood glucose reading today was 274 mg/dL.    Opportunities for ongoing education and support in diabetes-self management were discussed.    Pt verbalized understanding of concepts discussed and recommendations provided today.       Education Materials Provided:  [place] Understanding Diabetes Booklet and One Touch Verio meter kit    PLAN:  See Patient Instructions for co-developed, patient-stated behavior change goals.  Meal Plan Recommendation: eat 3 meals a day, have small snacks between  meals, if needed and Aim for 3-4 carb servings at meals and 1-2 carb servings at snacks  Exercise / activity plan: aim for at least 3-4 days per week of walking 2 miles  Check blood sugars fasting  AVS printed and provided to patient today.    FOLLOW-UP:  Follow-up appointment scheduled on 12/14/17.  Education topics to cover at the next diabetes education visit(s): complication prevention, heart health, review diet, weight, and BGs  Chart routed to referring provider.    Ongoing plan for education and support: Written resources (magazines, books, etc.), Follow-up visit with diabetes educator in 6 weeks and Follow-up with primary care provider    SANAM Woods CDE    Time Spent: 60 minutes  Encounter Type: Individual    Any diabetes medication dose changes were made via the CDE Protocol and Collaborative Practice Agreement with the patient's referring provider. A copy of this encounter was shared with the provider.

## 2017-12-06 ENCOUNTER — TRANSFERRED RECORDS (OUTPATIENT)
Dept: HEALTH INFORMATION MANAGEMENT | Facility: CLINIC | Age: 57
End: 2017-12-06

## 2018-01-09 ENCOUNTER — ALLIED HEALTH/NURSE VISIT (OUTPATIENT)
Dept: EDUCATION SERVICES | Facility: CLINIC | Age: 58
End: 2018-01-09
Payer: COMMERCIAL

## 2018-01-09 DIAGNOSIS — E11.9 TYPE 2 DIABETES MELLITUS WITHOUT COMPLICATION, WITHOUT LONG-TERM CURRENT USE OF INSULIN (H): Primary | ICD-10-CM

## 2018-01-09 PROCEDURE — G0108 DIAB MANAGE TRN  PER INDIV: HCPCS

## 2018-01-09 NOTE — PATIENT INSTRUCTIONS
My Diabetes Care Goals:    Monitoring: Keep checking fasting blood sugar each day.     Follow up:  Follow-up diabetes education about 1-2 months after starting Metformin.      Bring blood glucose meter and logbook with you to all doctor and follow-up appointments.     [place] [facility] Nutrition Services for the [facility] Area:  For Your Diabetes Education and Nutrition Appointments Call:  [phone]   For Diabetes Education or Nutrition Related Questions:   Phone: [phone]  E-mail: [email or web address]  Fax: [phone]   If you need a medication refill please contact your pharmacy. Please allow 3 business days for your refills to be completed.    Instructions for emailing the Diabetes Educators    If you need to communicate a non-urgent message to a Diabetes Educator via email, please send to [email or web address].    Please follow the following email guidelines:    Subject line: Secure: your clinic name (example: Secure: [facility])  In the email please include: First name, middle initial, last name and date of birth.    We will be in touch with you within one (1) business day.

## 2018-01-09 NOTE — PROGRESS NOTES
Diabetes Self Management Training: Follow-up Visit    Joey Santillan presents today for education related to Type 2 diabetes.    He is accompanied by self    Patient's diabetes management related comments/concerns: Not seeing any difference in his numbers    Patient would like this visit to be focused around the following diabetes-related behaviors and goals: Healthy Eating    ASSESSMENT:  Patient Problem List reviewed for relevant medical history and current medical status.    Current Diabetes Management per Patient:  Taking diabetes medications? no    Patient glucose self monitoring as follows: one time daily.   BG meter: One Touch Verio meter  BG results: fasting glucose- 195, 166, 202, 163, 187, 188, 201, 202, 201, 200, 121, 222, 203, 180, 194, 175, 172, 206, 201, 196, 181   [address]    BG values are: Not in goal    Patient's most recent   Lab Results   Component Value Date    A1C 9.2 10/16/2017    is not meeting goal of <7.0    Nutrition:  Patient eats 3 meals per day. Eating lots of greens    Breakfast - 1 slice WW toast with almond butter and smoothie with apples, plain yogurt, spinach OR misses OR oatmeal (no honey anymore)  Lunch - kale spinach salad with carrots and green olives, tomatoes, avocado, eggs, apple and water OR quinoa with kale, black beans, green onion, salsa, refried beans (fat free) and water   Dinner - chicken or fish and vegetables and quinoa (~1 c)  Snacks - nuts (almonds)    Beverages: water, unsweetened tea    Cultural/Sabianism diet restrictions: No     Biggest Challenge to Healthy Eating: none    Physical Activity:    At least 4 days per week and is running more.     Diabetes Complications:  Chronic Complication Prevention: Eyes: exam within in the last year? Yes  Nerve/Circulation: foot exam within the last year Yes  Heart Health: BP to goal Yes, LDL to goal No, Daily Aspirin Yes (when he remembers)  Dental Health: brushing/flossing regularly Yes, dental exam within last  "year Yes  Immunizations (flu/pneumonia) up to date? No    Vitals:  There were no vitals taken for this visit.  Estimated body mass index is 29.03 kg/(m^2) as calculated from the following:    Height as of 10/23/17: 1.854 m (6' 1\").    Weight as of 10/23/17: 99.8 kg (220 lb).     Last 3 BP:   BP Readings from Last 3 Encounters:   10/23/17 105/73   09/28/17 115/81   08/31/15 118/80       History   Smoking Status     Current Some Day Smoker     Types: Cigars   Smokeless Tobacco     Never Used     Comment: Cigar twice a month       Labs:  Lab Results   Component Value Date    A1C 9.2 10/16/2017     Lab Results   Component Value Date     09/28/2017     Lab Results   Component Value Date     09/28/2017     HDL Cholesterol   Date Value Ref Range Status   09/28/2017 44 >39 mg/dL Final   ]  GFR Estimate   Date Value Ref Range Status   09/28/2017 >90 >60 mL/min/1.7m2 Final     Comment:     Non  GFR Calc     GFR Estimate If Black   Date Value Ref Range Status   09/28/2017 >90 >60 mL/min/1.7m2 Final     Comment:      GFR Calc     Lab Results   Component Value Date    CR 0.77 09/28/2017     No results found for: MICROALBUMIN    Health Beliefs and Attitudes:   Patient Activation Measure Survey Score:  OMAIRA Score (Last Two) 9/26/2011   OMAIRA Raw Score 48   Activation Score 80   OMAIRA Level 4       Stage of Change: ACTION (Actively working towards change)    Progress toward meeting diabetes-related behavioral goals:    GOALS % Met Goal   Healthy Eating 100   Physical Activity 75   Monitoring 100   Medication Taking  NA   Problem Solving 100   Healthy Coping 75   Risk Reduction 75       Diabetes knowledge and skills assessment:     Patient is knowledgeable in diabetes management concepts related to: Healthy Eating, Being Active and Monitoring    Patient needs further education on the following diabetes management concepts: Healthy Eating, Monitoring, Taking Medication, Problem Solving, " Reducing Risks and Healthy Coping    Barriers to Learning Assessment: No Barriers identified    Based on learning assessment above, most appropriate setting for further diabetes education would be: Group class or Individual setting.    INTERVENTION:  Discussed that as most of his BGs are elevated, would recommend to begin medication (especially as he is watching his diet so closely and has been exercising). Briefly discussed that with metformin ER he could take it once/day as he struggles to remember his aspirin. He is also worried about the side effects so informed him that with ER there is usually less GI side effects.     Education provided today on:  AADE Self-Care Behaviors:  Healthy Eating: consistency in amount, composition, and timing of food intake and heart healthy diet. Praised him on all of his diet modifications and encouraged him to continue. Focused on heart health and limiting sodium.  Monitoring: log and interpret results and individual blood glucose targets  Problem Solving: high blood glucose - causes, signs/symptoms, treatment and prevention, when to call health care provider and sick day arrangements  Reducing Risks: major complications of diabetes, prevention, early diagnostic measures and treatment of complications, foot care, appropriate dental care, annual eye exam, smoking cessation, aspirin therapy, A1C - goals, relating to blood glucose levels, how often to check, lipids levels and goals and blood pressure and goals  Healthy Coping: benefits of making appropriate lifestyle changes and methods for coping with stress    Opportunities for ongoing education and support in diabetes-self management were discussed.    Pt verbalized understanding of concepts discussed and recommendations provided today.       Education Materials Provided:  Goals for Your Diabetes Care    PLAN:  See Patient Instructions for co-developed, patient-stated behavior change goals.  Meal Plan Recommendation: eat 3 meals  a day, have small snacks between meals, if needed and Aim for 3-4 carb servings at meals and 0-1 carb servings at snacks  Exercise / activity plan: continue to work out at least 4 days per week.   Check blood sugars fasting  Keep a blood glucose record for next visit.  As the majority of pt's blood sugars are above fasting target of , recommend to begin Metformin.   AVS printed and provided to patient today.    FOLLOW-UP:  Follow-up appointment scheduled 1-2 months after he sees his PCP next.   Chart routed to referring provider.    Ongoing plan for education and support: Written resources (magazines, books, etc.), Follow-up visit with diabetes educator in about 2-3 months (1-2 months after seeing PCP) and Follow-up with primary care provider    SANAM Woods CDE    Time Spent: 45 minutes  Encounter Type: Individual    Any diabetes medication dose changes were made via the CDE Protocol and Collaborative Practice Agreement with the patient's referring provider. A copy of this encounter was shared with the provider.

## 2018-01-09 NOTE — MR AVS SNAPSHOT
After Visit Summary   1/9/2018    Joey Santillan    MRN: [id number]           Patient Information     Date Of Birth          1960        Visit Information        Provider Department      1/9/2018 10:30 AM  DIABETIC ED RESOURCE [facility]        Today's Diagnoses     Type 2 diabetes mellitus without complication, without long-term current use of insulin (H)    -  1      Care Instructions    My Diabetes Care Goals:    Monitoring: Keep checking fasting blood sugar each day.     Follow up:  Follow-up diabetes education about 1-2 months after starting Metformin.      Bring blood glucose meter and logbook with you to all doctor and follow-up appointments.     [facility] for the [facility]:  For Your Diabetes Education and Nutrition Appointments Call:  [phone]   For Diabetes Education or Nutrition Related Questions:   Phone: [phone]  E-mail: [email or web address]  Fax: [phone]   If you need a medication refill please contact your pharmacy. Please allow 3 business days for your refills to be completed.    Instructions for emailing the Diabetes Educators    If you need to communicate a non-urgent message to a Diabetes Educator via email, please send to [email or web address].    Please follow the following email guidelines:    Subject line: Secure: your clinic name (example: Secure: Joe)  In the email please include: First name, middle initial, last name and date of birth.    We will be in touch with you within one (1) business day.             Follow-ups after your visit        Who to contact     If you have questions or need follow up information about today's clinic visit or your schedule please contact [facility] directly at [phone].  Normal or non-critical lab and imaging results will be communicated to you by MyChart, letter or phone within 4 business days after the clinic  has received the results. If you do not hear from us within 7 days, please contact the clinic through Varol[employer] or phone. If you have a critical or abnormal lab result, we will notify you by phone as soon as possible.  Submit refill requests through Varolii or call your pharmacy and they will forward the refill request to us. Please allow 3 business days for your refill to be completed.          Additional Information About Your Visit        [facility] Information     Varolii gives you secure access to your electronic health record. If you see a primary care provider, you can also send messages to your care team and make appointments. If you have questions, please call your primary care clinic.  If you do not have a primary care provider, please call [phone] and they will assist you.        Care EveryWhere ID     This is your Care EveryWhere ID. This could be used by other organizations to access your [facility] medical records  [id number]         Blood Pressure from Last 3 Encounters:   10/23/17 105/73   09/28/17 115/81   08/31/15 118/80    Weight from Last 3 Encounters:   10/23/17 99.8 kg (220 lb)   09/28/17 99.8 kg (220 lb)   08/31/15 98 kg (216 lb)              Today, you had the following     No orders found for display       Primary Care Provider Office Phone # Fax #    Narciso Cisneros [phone]       [facility] [address]  [place] [address]        Equal Access to Services     [facility] : Hadii gregg ku ismaelo Solinden, waaxda luqadaha, qaybta kaalmada emery, elsa leblanc. So [facility] [phone].    ATENCIÓN: Si habla español, tiene a montesinos disposición servicios gratuitos de asistencia lingüística. Llame al [phone].    We comply with applicable federal civil rights laws and [place] laws. We do not discriminate on the basis of race, color, national origin, age, disability, sex, sexual orientation, or gender identity.             Thank you!     Thank you for choosing [facility] [place]  for your care. Our goal is always to provide you with excellent care. Hearing back from our patients is one way we can continue to improve our services. Please take a few minutes to complete the written survey that you may receive in the mail after your visit with us. Thank you!             Your Updated Medication List - Protect others around you: Learn how to safely use, store and throw away your medicines at www.disposemymeds.org.          This list is accurate as of: 1/9/18 11:15 AM.  Always use your most recent med list.                   Brand Name Dispense Instructions for use Diagnosis    ACE/ARB/ARNI NOT PRESCRIBED (INTENTIONAL)      Please choose reason not prescribed, below    Type 2 diabetes mellitus without complication, without long-term current use of insulin (H)       aspirin 81 MG tablet     90 tablet    Take 1 tablet (81 mg) by mouth daily    Type 2 diabetes mellitus without complication, without long-term current use of insulin (H)       blood glucose monitoring lancets     100 each    Use to test blood sugar 1 times daily or as directed.  Ok to substitute alternative if insurance prefers.    Type 2 diabetes mellitus without complication, without long-term current use of insulin (H)       blood glucose monitoring test strip    ONETOUCH VERIO IQ    50 each    Use to test blood sugars 1 times daily or as directed.    Type 2 diabetes mellitus without complication, without long-term current use of insulin (H)       metFORMIN 500 MG tablet    GLUCOPHAGE    180 tablet    Take 1 tablet (500 mg) by mouth 2 times daily (with meals)    Type 2 diabetes mellitus without complication, without long-term current use of insulin (H)       nystatin 771011 UNIT/GM Powd    MYCOSTATIN    30 g    Apply 2 times per day for 2 weeks then as needed    Intertrigo

## 2018-05-16 ENCOUNTER — OFFICE VISIT (OUTPATIENT)
Dept: FAMILY MEDICINE | Facility: CLINIC | Age: 58
End: 2018-05-16
Payer: COMMERCIAL

## 2018-05-16 VITALS
SYSTOLIC BLOOD PRESSURE: 124 MMHG | TEMPERATURE: 98.7 F | HEIGHT: 73 IN | BODY MASS INDEX: 28.8 KG/M2 | HEART RATE: 72 BPM | DIASTOLIC BLOOD PRESSURE: 79 MMHG | RESPIRATION RATE: 20 BRPM | WEIGHT: 217.3 LBS | OXYGEN SATURATION: 97 %

## 2018-05-16 DIAGNOSIS — R07.0 THROAT PAIN: Primary | ICD-10-CM

## 2018-05-16 DIAGNOSIS — E11.9 TYPE 2 DIABETES MELLITUS WITHOUT COMPLICATION, WITHOUT LONG-TERM CURRENT USE OF INSULIN (H): ICD-10-CM

## 2018-05-16 LAB
DEPRECATED S PYO AG THROAT QL EIA: NORMAL
SPECIMEN SOURCE: NORMAL

## 2018-05-16 PROCEDURE — 99207 ZZC FOR CODING REVIEW: CPT | Performed by: FAMILY MEDICINE

## 2018-05-16 PROCEDURE — 87081 CULTURE SCREEN ONLY: CPT | Performed by: FAMILY MEDICINE

## 2018-05-16 PROCEDURE — 99214 OFFICE O/P EST MOD 30 MIN: CPT | Performed by: FAMILY MEDICINE

## 2018-05-16 PROCEDURE — 87880 STREP A ASSAY W/OPTIC: CPT | Performed by: FAMILY MEDICINE

## 2018-05-16 NOTE — PATIENT INSTRUCTIONS
Stay hydrated  Over the counter ibuprofen with meal as needed   Over the counter cough drops ok      Diabetic diet  Stay active  Keep record of blood sugars, if possible three times daily- one of them 2 hrs post meal   Target  pre meal glucose :110  Target  2 hr post meals or bedtime  glucose 160- 180   Stay open minded for medications  use  Side effects of medications are far more controlled than complication of uncontrolled Diabetes

## 2018-05-16 NOTE — PROGRESS NOTES
"  SUBJECTIVE:   Joey Santillan is a 57 year old male who presents to clinic today for the following health issues:      RESPIRATORY SYMPTOMS      Duration: 6+ days    Description  sore throat and drainage    Severity: moderate    Accompanying signs and symptoms: None    History (predisposing factors):  strep exposure    Precipitating or alleviating factors: None    Therapies tried and outcome:  none      PROBLEMS TO ADD ON...exposure to strept at home  Concerned about low immunity due to Diabetes   Often stressed at work, school, and preparing for test  Diabetes  - controlling with diet  Checks glucose pre meals /am once daily   Averaging 180  Has not started Glucophage due to concerns of Gastroenterology complications  Has plans to meet primary care physicain in 4 week    Problem list and histories reviewed & adjusted, as indicated.  Additional history: as documented    Patient Active Problem List   Diagnosis     Overweight     Type 2 diabetes mellitus without complication, without long-term current use of insulin (H)     Hyperlipidemia LDL goal <100     History reviewed. No pertinent surgical history.    Social History   Substance Use Topics     Smoking status: Current Some Day Smoker     Types: Cigars     Smokeless tobacco: Never Used      Comment: Cigar twice a month     Alcohol use Yes      Comment: 0-4 drinks per week     Family History   Problem Relation Age of Onset     C.A.D. Father      DIABETES Mother      Alzheimer Disease Paternal Grandmother      Arthritis Maternal Grandmother      Cancer - colorectal No family hx of      Prostate Cancer No family hx of            Reviewed and updated as needed this visit by clinical staff       Reviewed and updated as needed this visit by Provider         ROS:  Constitutional, HEENT, cardiovascular, pulmonary, gi and gu systems are negative, except as otherwise noted.    OBJECTIVE:     /79  Pulse 72  Temp 98.7  F (37.1  C) (Oral)  Resp 20  Ht 6' 1\" " (1.854 m)  Wt 217 lb 4.8 oz (98.6 kg)  SpO2 97%  BMI 28.67 kg/m2  Body mass index is 28.67 kg/(m^2).  GENERAL: healthy, alert and no distress  HENT: ear canals and TM's normal, nose and mouth without ulcers or lesions  NECK: no adenopathy, no asymmetry, masses, or scars and thyroid normal to palpation  RESP: lungs clear to auscultation - no rales, rhonchi or wheezes  CV: regular rate and rhythm, normal S1 S2,Diagnostic Test Results:  Results for orders placed or performed in visit on 05/16/18 (from the past 24 hour(s))   Rapid strep screen   Result Value Ref Range    Specimen Description Throat     Rapid Strep A Screen       NEGATIVE: No Group A streptococcal antigen detected by immunoassay, await culture report.       ASSESSMENT/PLAN:     1. Throat pain  -viral pharyngitis, symptomatic treatment , with good hydration, relative rest  -ok to take over the counter NSAID with meals, ibuprofen as needed up to three times daily for next 3 - 5days   - Rapid strep screen is negative   - Beta strep group A culture is pending, will inform him , if positive     2. Type 2 diabetes mellitus without complication, without long-term current use of insulin (H)  -long discussion with patient permission about Diabetes    -Encouraged Diabetic diet,Stay active- he is trying  -Encouraged to bring SMBG for primary care physicain   -Target  pre meal glucose :110  -Target  2 hr post meals or bedtime  glucose 160- 180   -targetStay open minded for medications  use  -Side effects of medications are far more controlled than complication of uncontrolled Diabetes        3. Hyperlipidemia LDL goal <100  Diet control                  Farhana Austin MD  [facility]

## 2018-05-16 NOTE — MR AVS SNAPSHOT
After Visit Summary   5/16/2018    Joey Santillan    MRN: [id number]           Patient Information     Date Of Birth          1960        Visit Information        Provider Department      5/16/2018 11:00 AM Farhana Austin MD [facility]        Today's Diagnoses     Throat pain    -  1    Type 2 diabetes mellitus without complication, without long-term current use of insulin (H)        Hyperlipidemia LDL goal <100          Care Instructions    Stay hydrated  Over the counter ibuprofen with meal as needed   Over the counter cough drops ok      Diabetic diet  Stay active  Keep record of blood sugars, if possible three times daily- one of them 2 hrs post meal   Target  pre meal glucose :110  Target  2 hr post meals or bedtime  glucose 160- 180   Stay open minded for medications  use  Side effects of medications are far more controlled than complication of uncontrolled Diabetes            Follow-ups after your visit        Your next 10 appointments already scheduled     Jony 15, 2018  8:00 AM CDT   Mynor Ibarra with Narciso Cisneros MD   [facility]    [address] [facility] [address] [phone]              Who to contact     If you have questions or need follow up information about today's clinic visit or your schedule please contact [facility] directly at [phone].  Normal or non-critical lab and imaging results will be communicated to you by MyChart, letter or phone within 4 business days after the clinic has received the results. If you do not hear from us within 7 days, please contact the clinic through MyChart or phone. If you have a critical or abnormal lab result, we will notify you by phone as soon as possible.  Submit refill requests through [facility] or call your pharmacy and they will forward the refill request to us. Please allow 3 business days for your refill to be completed.          Additional  "Information About Your Visit        MyChart Information     [employer] gives you secure access to your electronic health record. If you see a primary care provider, you can also send messages to your care team and make appointments. If you have questions, please call your primary care clinic.  If you do not have a primary care provider, please call [phone] and they will assist you.        Care EveryWhere ID     This is your Care EveryWhere ID. This could be used by other organizations to access your [place] medical records  [id number]        Your Vitals Were     Pulse Temperature Respirations Height Pulse Oximetry BMI (Body Mass Index)    72 98.7  F (37.1  C) (Oral) 20 6' 1\" (1.854 m) 97% 28.67 kg/m2       Blood Pressure from Last 3 Encounters:   05/16/18 124/79   10/23/17 105/73   09/28/17 115/81    Weight from Last 3 Encounters:   05/16/18 217 lb 4.8 oz (98.6 kg)   10/23/17 220 lb (99.8 kg)   09/28/17 220 lb (99.8 kg)              We Performed the Following     Beta strep group A culture     Rapid strep screen        Primary Care Provider Office Phone # Fax #    Narciso MICHAEL MD [place] [phone] [address] [facility] [address]  [facility] [address]        Equal Access to Services     [facility]: Hadii aad ku hadasho Soomaali, waaxda luqadaha, qaybta kaalmada adeegyada, waxay idiin hayaan mani jang . So [facility] [phone].    ATENCIÓN: Si habla español, tiene a montesinos disposición servicios gratuitos de asistencia lingüística. Llame al [phone].    We comply with applicable federal civil rights laws and [place] laws. We do not discriminate on the basis of race, color, national origin, age, disability, sex, sexual orientation, or gender identity.            Thank you!     Thank you for choosing [facility]  for your care. Our goal is always to provide you with excellent care. Hearing back from our patients is one way we can continue to improve our services. Please take a few " minutes to complete the written survey that you may receive in the mail after your visit with us. Thank you!             Your Updated Medication List - Protect others around you: Learn how to safely use, store and throw away your medicines at www.disposemymeds.org.          This list is accurate as of 5/16/18 11:38 AM.  Always use your most recent med list.                   Brand Name Dispense Instructions for use Diagnosis    ACE/ARB/ARNI NOT PRESCRIBED (INTENTIONAL)      Please choose reason not prescribed, below    Type 2 diabetes mellitus without complication, without long-term current use of insulin (H)       aspirin 81 MG tablet     90 tablet    Take 1 tablet (81 mg) by mouth daily    Type 2 diabetes mellitus without complication, without long-term current use of insulin (H)       blood glucose monitoring lancets     100 each    Use to test blood sugar 1 times daily or as directed.  Ok to substitute alternative if insurance prefers.    Type 2 diabetes mellitus without complication, without long-term current use of insulin (H)       blood glucose monitoring test strip    ONETOUCH VERIO IQ    50 each    Use to test blood sugars 1 times daily or as directed.    Type 2 diabetes mellitus without complication, without long-term current use of insulin (H)       metFORMIN 500 MG tablet    GLUCOPHAGE    180 tablet    Take 1 tablet (500 mg) by mouth 2 times daily (with meals)    Type 2 diabetes mellitus without complication, without long-term current use of insulin (H)       nystatin 308103 UNIT/GM Powd    MYCOSTATIN    30 g    Apply 2 times per day for 2 weeks then as needed    Intertrigo

## 2018-05-17 LAB
BACTERIA SPEC CULT: NORMAL
SPECIMEN SOURCE: NORMAL

## 2018-06-14 ENCOUNTER — MYC MEDICAL ADVICE (OUTPATIENT)
Dept: FAMILY MEDICINE | Facility: CLINIC | Age: 58
End: 2018-06-14

## 2018-06-14 DIAGNOSIS — E11.9 TYPE 2 DIABETES MELLITUS WITHOUT COMPLICATION, WITHOUT LONG-TERM CURRENT USE OF INSULIN (H): Primary | ICD-10-CM

## 2018-06-14 NOTE — PROGRESS NOTES
SUBJECTIVE:   Joey Santillan is a 57 year old male who presents to clinic today for the following health issues:      Diabetes Follow-up    Patient is checking blood sugars: once daily.  Results are as follows:         am - 180    Diabetic concerns: None     Symptoms of hypoglycemia (low blood sugar): none     Paresthesias (numbness or burning in feet) or sores: No     Date of last diabetic eye exam:     BP Readings from Last 2 Encounters:   05/16/18 124/79   10/23/17 105/73     Hemoglobin A1C (%)   Date Value   10/16/2017 9.2 (H)   08/31/2015 6.4 (H)     LDL Cholesterol Calculated (mg/dL)   Date Value   09/28/2017 103 (H)   08/31/2015 115     Hyperlipidemia Follow-Up      Rate your low fat/cholesterol diet?: good    Taking statin?  No    Other lipid medications/supplements?:  none      Amount of exercise or physical activity: 4-5 days/week for an average of 45-60 minutes    Problems taking medications regularly: No    Medication side effects: none    Diet: regular (no restrictions)            Problem list and histories reviewed & adjusted, as indicated.  Additional history: as documented    Patient Active Problem List   Diagnosis     Overweight     Type 2 diabetes mellitus without complication, without long-term current use of insulin (H)     Hyperlipidemia LDL goal <100     No past surgical history on file.    Social History   Substance Use Topics     Smoking status: Current Some Day Smoker     Types: Cigars     Smokeless tobacco: Never Used      Comment: Cigar twice a month     Alcohol use Yes      Comment: 0-4 drinks per week     Family History   Problem Relation Age of Onset     C.A.D. Father      DIABETES Mother      Alzheimer Disease Paternal Grandmother      Arthritis Maternal Grandmother      Cancer - colorectal No family hx of      Prostate Cancer No family hx of          Current Outpatient Prescriptions   Medication Sig Dispense Refill     ACE/ARB/ARNI NOT PRESCRIBED, INTENTIONAL, Please choose  "reason not prescribed, below (Patient not taking: Reported on 6/15/2018)       aspirin 81 MG tablet Take 1 tablet (81 mg) by mouth daily 90 tablet 3     blood glucose monitoring (ONE TOUCH DELICA) lancets Use to test blood sugar 1 times daily or as directed.  Ok to substitute alternative if insurance prefers. 100 each 6     blood glucose monitoring (ONE TOUCH VERIO IQ) test strip Use to test blood sugars 1 times daily or as directed. 50 each 6     metFORMIN (GLUCOPHAGE) 500 MG tablet Take 1 tablet (500 mg) by mouth 2 times daily (with meals) (Patient not taking: Reported on 10/23/2017) 180 tablet 3     No Known Allergies    Reviewed and updated as needed this visit by clinical staff       Reviewed and updated as needed this visit by Provider         ROS:  Constitutional, HEENT, cardiovascular, pulmonary, gi and gu systems are negative, except as otherwise noted.    OBJECTIVE:     /80  Pulse 66  Temp 97.8  F (36.6  C) (Oral)  Ht 6' 1\" (1.854 m)  Wt 215 lb (97.5 kg)  SpO2 97%  BMI 28.37 kg/m2  Body mass index is 28.37 kg/(m^2).  GENERAL: healthy, alert and no distress  EYES: Eyes grossly normal to inspection, PERRL and conjunctivae and sclerae normal  HENT: ear canals and TM's normal, nose and mouth without ulcers or lesions  NECK: no adenopathy, no asymmetry, masses, or scars and thyroid normal to palpation  RESP: lungs clear to auscultation - no rales, rhonchi or wheezes  CV: Heart with regular rate and rhythm.   ABDOMEN: soft, nontender, no hepatosplenomegaly, no masses and bowel sounds normal  MS: no gross musculoskeletal defects noted, no edema  NEURO: Normal strength and tone, mentation intact and speech normal  PSYCH: mentation appears normal, affect normal/bright    A1c pending lipids pending     ASSESSMENT/PLAN:       1. Type 2 diabetes mellitus without complication, without long-term current use of insulin (H)  Start metformin  Side effects and risks discussed again  Tried to address his concerns " about the drug as best I could  Recheck A1c in 3 months   - metFORMIN (GLUCOPHAGE) 500 MG tablet; Take 1 tablet (500 mg) by mouth 2 times daily (with meals)  Dispense: 180 tablet; Refill: 3  - Hemoglobin A1c    2. Hyperlipidemia LDL goal <100  Recheck lipids today after diet changes  - Lipid panel reflex to direct LDL Fasting    3. Overweight  Counseled on diet and exercise interventions to promote weight loss   Avoid alcohol, cut down on carbs     4. Personal history of tobacco use, presenting hazards to health  Counseled on the fact that there is no safe amount of tobacco, he needs to stop the cigars      FUTURE APPOINTMENTS:       - Follow-up visit physical in 3 months     Total time > 25 minutes > 50% counseling/coordinating care    Screen colon reminder and referral sent    Narciso Cisneros MD  [facility]

## 2018-06-15 ENCOUNTER — OFFICE VISIT (OUTPATIENT)
Dept: FAMILY MEDICINE | Facility: CLINIC | Age: 58
End: 2018-06-15
Payer: COMMERCIAL

## 2018-06-15 VITALS
SYSTOLIC BLOOD PRESSURE: 118 MMHG | WEIGHT: 215 LBS | OXYGEN SATURATION: 97 % | DIASTOLIC BLOOD PRESSURE: 80 MMHG | BODY MASS INDEX: 28.49 KG/M2 | HEART RATE: 66 BPM | HEIGHT: 73 IN | TEMPERATURE: 97.8 F

## 2018-06-15 DIAGNOSIS — E11.9 TYPE 2 DIABETES MELLITUS WITHOUT COMPLICATION, WITHOUT LONG-TERM CURRENT USE OF INSULIN (H): Primary | ICD-10-CM

## 2018-06-15 DIAGNOSIS — E78.5 HYPERLIPIDEMIA LDL GOAL <100: ICD-10-CM

## 2018-06-15 DIAGNOSIS — E66.3 OVERWEIGHT: ICD-10-CM

## 2018-06-15 DIAGNOSIS — Z12.11 SCREENING FOR COLON CANCER: ICD-10-CM

## 2018-06-15 DIAGNOSIS — Z87.891 PERSONAL HISTORY OF TOBACCO USE, PRESENTING HAZARDS TO HEALTH: ICD-10-CM

## 2018-06-15 LAB
CHOLEST SERPL-MCNC: 183 MG/DL
HBA1C MFR BLD: 7.4 % (ref 0–5.6)
HDLC SERPL-MCNC: 48 MG/DL
LDLC SERPL CALC-MCNC: 104 MG/DL
NONHDLC SERPL-MCNC: 135 MG/DL
TRIGL SERPL-MCNC: 157 MG/DL

## 2018-06-15 PROCEDURE — 80061 LIPID PANEL: CPT | Performed by: INTERNAL MEDICINE

## 2018-06-15 PROCEDURE — 83036 HEMOGLOBIN GLYCOSYLATED A1C: CPT | Performed by: INTERNAL MEDICINE

## 2018-06-15 PROCEDURE — 36415 COLL VENOUS BLD VENIPUNCTURE: CPT | Performed by: INTERNAL MEDICINE

## 2018-06-15 PROCEDURE — 99214 OFFICE O/P EST MOD 30 MIN: CPT | Performed by: INTERNAL MEDICINE

## 2018-06-15 NOTE — MR AVS SNAPSHOT
After Visit Summary   6/15/2018    Joey Santillan    MRN: [id number]           Patient Information     Date Of Birth          1960        Visit Information        Provider Department      6/15/2018 8:00 AM Narciso Cisneros MD [facility]        Today's Diagnoses     Type 2 diabetes mellitus without complication, without long-term current use of insulin (H)    -  1    Hyperlipidemia LDL goal <100        Overweight        Personal history of tobacco use, presenting hazards to health        Screening for colon cancer           Follow-ups after your visit        Additional Services     GASTROENTEROLOGY ADULT REF PROCEDURE ONLY [facility] [phone]                 Follow-up notes from your care team     Return in about 3 months (around 9/15/2018) for Physical Exam.      Future tests that were ordered for you today     Open Future Orders        Priority Expected Expires Ordered    Hemoglobin A1c Routine  6/14/2019 6/14/2018            Who to contact     If you have questions or need follow up information about today's clinic visit or your schedule please contact [facility] directly at [phone].  Normal or non-critical lab and imaging results will be communicated to you by IROCKEhart, letter or phone within 4 business days after the clinic has received the results. If you do not hear from us within 7 days, please contact the clinic through IROCKEhart or phone. If you have a critical or abnormal lab result, we will notify you by phone as soon as possible.  Submit refill requests through NoteSick or call your pharmacy and they will forward the refill request to us. Please allow 3 business days for your refill to be completed.          Additional Information About Your Visit        MyChart Information     NoteSick gives you secure access to your electronic health record. If you see a primary care provider, you can also send messages to your care team and make  "appointments. If you have questions, please call your primary care clinic.  If you do not have a primary care provider, please call [phone] and they will assist you.        Care EveryWhere ID     This is your Care EveryWhere ID. This could be used by other organizations to access your [place] medical records  [id number]        Your Vitals Were     Pulse Temperature Height Pulse Oximetry BMI (Body Mass Index)       66 97.8  F (36.6  C) (Oral) 6' 1\" (1.854 m) 97% 28.37 kg/m2        Blood Pressure from Last 3 Encounters:   06/15/18 118/80   05/16/18 124/79   10/23/17 105/73    Weight from Last 3 Encounters:   06/15/18 215 lb (97.5 kg)   05/16/18 217 lb 4.8 oz (98.6 kg)   10/23/17 220 lb (99.8 kg)              We Performed the Following     GASTROENTEROLOGY ADULT REF PROCEDURE ONLY [facility] [phone]     Hemoglobin A1c     Lipid panel reflex to direct LDL Fasting          Today's Medication Changes          These changes are accurate as of 6/15/18  8:39 AM.  If you have any questions, ask your nurse or doctor.               Stop taking these medicines if you haven't already. Please contact your care team if you have questions.     nystatin 108878 UNIT/GM Powd   Commonly known as:  MYCOSTATIN   Stopped by:  Narciso Cisneros MD                Where to get your medicines      These medications were sent to [id number] [address] [address]     Phone:  [phone]     metFORMIN 500 MG tablet                Primary Care Provider Office Phone # Fax #    Narciso Cisneros [phone] [address]  [facility] [address]        Equal Access to Services     JANELLE SALGADO : Charles Valle, ladonna rucker, shruthi kaalmaelsa cuevas. So [facility] [phone].    ATENCIÓN: Si habla español, tiene a montesinos disposición servicios gratuitos de asistencia lingüística. Llame " al [phone].    We comply with applicable federal civil rights laws and [place] laws. We do not discriminate on the basis of race, color, national origin, age, disability, sex, sexual orientation, or gender identity.            Thank you!     Thank you for choosing [facility]  for your care. Our goal is always to provide you with excellent care. Hearing back from our patients is one way we can continue to improve our services. Please take a few minutes to complete the written survey that you may receive in the mail after your visit with us. Thank you!             Your Updated Medication List - Protect others around you: Learn how to safely use, store and throw away your medicines at www.disposemymeds.org.          This list is accurate as of 6/15/18  8:39 AM.  Always use your most recent med list.                   Brand Name Dispense Instructions for use Diagnosis    ACE/ARB/ARNI NOT PRESCRIBED (INTENTIONAL)      Please choose reason not prescribed, below    Type 2 diabetes mellitus without complication, without long-term current use of insulin (H)       aspirin 81 MG tablet     90 tablet    Take 1 tablet (81 mg) by mouth daily    Type 2 diabetes mellitus without complication, without long-term current use of insulin (H)       blood glucose monitoring lancets     100 each    Use to test blood sugar 1 times daily or as directed.  Ok to substitute alternative if insurance prefers.    Type 2 diabetes mellitus without complication, without long-term current use of insulin (H)       blood glucose monitoring test strip    ONETOUCH VERIO IQ    50 each    Use to test blood sugars 1 times daily or as directed.    Type 2 diabetes mellitus without complication, without long-term current use of insulin (H)       metFORMIN 500 MG tablet    GLUCOPHAGE    180 tablet    Take 1 tablet (500 mg) by mouth 2 times daily (with meals)    Type 2 diabetes mellitus without complication, without long-term current use of  insulin (H)

## 2018-06-17 NOTE — PROGRESS NOTES
The following letter pertains to your most recent diagnostic tests:    Your cholesterol has not changed much.  You are still a candidate for a medication like atorvastatin (Lipitor) to lower cholesterol and to reduce risk of heart attack and stroke.  We can discuss that further at follow up appointments.      -A1c has improved, but remains above our goal of that less than 7.  The metformin should help with this.  You deserve credit for bringing it down from 9.2 to 7.4 with diet changes!      Follow up:  Preventive exam follow up in 3 months with A1c.        Sincerely,    Dr. Cisneros

## 2018-09-10 ENCOUNTER — HOSPITAL ENCOUNTER (OUTPATIENT)
Facility: CLINIC | Age: 58
Discharge: HOME | End: 2018-09-10
Attending: COLON & RECTAL SURGERY | Admitting: COLON & RECTAL SURGERY
Payer: COMMERCIAL

## 2018-09-10 ENCOUNTER — SURGERY (OUTPATIENT)
Age: 58
End: 2018-09-10

## 2018-09-10 VITALS
OXYGEN SATURATION: 94 % | DIASTOLIC BLOOD PRESSURE: 85 MMHG | RESPIRATION RATE: 25 BRPM | BODY MASS INDEX: 28.36 KG/M2 | WEIGHT: 214 LBS | SYSTOLIC BLOOD PRESSURE: 122 MMHG | HEIGHT: 73 IN

## 2018-09-10 LAB — COLONOSCOPY: NORMAL

## 2018-09-10 PROCEDURE — 45378 DIAGNOSTIC COLONOSCOPY: CPT | Performed by: COLON & RECTAL SURGERY

## 2018-09-10 PROCEDURE — G0121 COLON CA SCRN NOT HI RSK IND: HCPCS | Performed by: COLON & RECTAL SURGERY

## 2018-09-10 PROCEDURE — G0500 MOD SEDAT ENDO SERVICE >5YRS: HCPCS | Performed by: COLON & RECTAL SURGERY

## 2018-09-10 PROCEDURE — 25000128 H RX IP 250 OP 636: Performed by: COLON & RECTAL SURGERY

## 2018-09-10 RX ORDER — FENTANYL CITRATE 50 UG/ML
INJECTION, SOLUTION INTRAMUSCULAR; INTRAVENOUS PRN
Status: DISCONTINUED | OUTPATIENT
Start: 2018-09-10 | End: 2018-09-10 | Stop reason: HOSPADM

## 2018-09-10 RX ORDER — ONDANSETRON 2 MG/ML
4 INJECTION INTRAMUSCULAR; INTRAVENOUS
Status: DISCONTINUED | OUTPATIENT
Start: 2018-09-10 | End: 2018-09-10 | Stop reason: HOSPADM

## 2018-09-10 RX ORDER — LIDOCAINE 40 MG/G
CREAM TOPICAL
Status: DISCONTINUED | OUTPATIENT
Start: 2018-09-10 | End: 2018-09-10 | Stop reason: HOSPADM

## 2018-09-10 RX ADMIN — MIDAZOLAM 1 MG: 1 INJECTION INTRAMUSCULAR; INTRAVENOUS at 10:35

## 2018-09-10 RX ADMIN — MIDAZOLAM 2 MG: 1 INJECTION INTRAMUSCULAR; INTRAVENOUS at 10:32

## 2018-09-10 RX ADMIN — FENTANYL CITRATE 100 MCG: 50 INJECTION, SOLUTION INTRAMUSCULAR; INTRAVENOUS at 10:32

## 2018-09-10 NOTE — H&P
Colon & Rectal Surgery History and Physical  Pre-Endoscopy Procedure Note    History of Present Illness   I have been asked by Dr. Cisneros to evaluate this 57 year old male for colorectal cancer screening. He denies any abdominal pain, weight loss, bleeding per rectum, or recent change in bowel habits.    Past Medical History  Diagnosis Date     Hyperlipidemia LDL goal <100 10/23/2017     Pilonidal cyst      Type 2 diabetes mellitus without complication, without long-term current use of insulin (H) 10/23/2017       Past Surgical History    No pertinent surgical history.     Medications  Medication Sig     aspirin 81 MG tablet Take 1 tablet (81 mg) by mouth daily     metFORMIN (GLUCOPHAGE) 500 MG tablet Take 1 tablet (500 mg) by mouth 2 times daily (with meals)     ACE/ARB/ARNI NOT PRESCRIBED, INTENTIONAL, Please choose reason not prescribed, below (Patient not taking: Reported on 6/15/2018)     blood glucose monitoring (ONE TOUCH DELICA) lancets Use to test blood sugar 1 times daily or as directed.  Ok to substitute alternative if insurance prefers.     blood glucose monitoring (ONE TOUCH VERIO IQ) test strip Use to test blood sugars 1 times daily or as directed.       Allergies   No Known Allergies     Family History   Family history includes Alzheimer Disease in his paternal grandmother; Arthritis in his maternal grandmother; C.A.D. in his father; Diabetes in his mother.     Social History   He reports that he has been smoking Cigars.  He has never used smokeless tobacco. He reports that he drinks alcohol. He reports that he does not use illicit drugs.    Review of Systems   Constitutional:  No fever, weight change or fatigue.    Eyes:     No dry eyes or vision changes.   Ears/Nose/Throat/Neck:  No oral ulcers, sore throat or voice change.    Cardiovascular:   No palpitations, syncope, angina or edema.   Respiratory:    No chest pain, excessive sleepiness, shortness of breath or hemoptysis.    Gastrointestinal:  "  No abdominal pain, nausea, vomiting, diarrhea or heartburn.    Genitourinary:   No dysuria, hematuria, urinary retention or urinary frequency.   Musculoskeletal:  No joint swelling or arthralgias.    Dermatologic:  No skin rash or other skin changes.   Neurologic:    No focal weakness or numbness. No neuropathy.   Psychiatric:    No depression, anxiety, suicidal ideation, or paranoid ideation.   Endocrine:   No cold or heat intolerance, polydipsia, hirsutism, change in libido, or flushing.   Hematology/Lymphatic:  No bleeding or lymphadenopathy.    Allergy/Immunology:  No rhinitis or hives.     Physical Exam   Vitals:  /86, RR 16, HR 64, height 1.854 m (6' 1\"), weight 97.1 kg (214 lb), SpO2 99 %.    General:  Alert and oriented to person, place and time   Airway: Normal oropharyngeal airway and neck mobility   Lungs:  Clear bilaterally   Heart:  Regular rate and rhythm   Abdomen: Soft, NT, ND, no masses   Rectal:  Perianal skin without excoriation, hemorrhoidal disease or anal fissure        Digital rectal examination reveals normal sphincter tone without masses    ASA Grade: II (mild systemic disease)    Impression: Cleared for use of conscious sedation for colorectal cancer screening    Plan: Proceed with colonoscopy     Mallory Rudd MD  [facility]  [phone]  "

## 2018-12-19 DIAGNOSIS — E11.9 TYPE 2 DIABETES MELLITUS WITHOUT COMPLICATION, WITHOUT LONG-TERM CURRENT USE OF INSULIN (H): ICD-10-CM

## 2018-12-19 NOTE — TELEPHONE ENCOUNTER
"Last Written Prescription Date:  10/23/17  Last Fill Quantity: 90,  # refills: 3   Last office visit: 6/15/2018 with prescribing provider:     Future Office Visit:    Requested Prescriptions   Pending Prescriptions Disp Refills     aspirin (ASA) 81 MG EC tablet [Pharmacy Med Name: Aspirin Oral Tablet Delayed Release 81 MG] 90 tablet 2     Sig: Take 1 tablet by mouth daily    Analgesics (Non-Narcotic Tylenol and ASA Only) Passed - 12/19/2018 10:59 AM       Passed - Recent (12 mo) or future (30 days) visit within the authorizing provider's specialty    Patient had office visit in the last 12 months or has a visit in the next 30 days with authorizing provider or within the authorizing provider's specialty.  See \"Patient Info\" tab in inbasket, or \"Choose Columns\" in Meds & Orders section of the refill encounter.             Passed - Patient is age 20 years or older    If ASA is flagged for ages under 20 years old. Forward to provider for confirmation Ryes Syndrome is not a concern.                "

## 2019-07-02 ENCOUNTER — MYC MEDICAL ADVICE (OUTPATIENT)
Dept: FAMILY MEDICINE | Facility: CLINIC | Age: 59
End: 2019-07-02

## 2019-07-05 ENCOUNTER — MYC REFILL (OUTPATIENT)
Dept: FAMILY MEDICINE | Facility: CLINIC | Age: 59
End: 2019-07-05

## 2019-07-05 ENCOUNTER — OFFICE VISIT (OUTPATIENT)
Dept: FAMILY MEDICINE | Facility: CLINIC | Age: 59
End: 2019-07-05
Payer: COMMERCIAL

## 2019-07-05 VITALS
DIASTOLIC BLOOD PRESSURE: 72 MMHG | HEIGHT: 74 IN | HEART RATE: 59 BPM | SYSTOLIC BLOOD PRESSURE: 106 MMHG | WEIGHT: 215.3 LBS | OXYGEN SATURATION: 97 % | BODY MASS INDEX: 27.63 KG/M2 | TEMPERATURE: 97 F

## 2019-07-05 DIAGNOSIS — Z12.5 PROSTATE CANCER SCREENING: ICD-10-CM

## 2019-07-05 DIAGNOSIS — E11.9 TYPE 2 DIABETES MELLITUS WITHOUT COMPLICATION, WITHOUT LONG-TERM CURRENT USE OF INSULIN (H): Primary | ICD-10-CM

## 2019-07-05 DIAGNOSIS — E78.5 HYPERLIPIDEMIA LDL GOAL <100: ICD-10-CM

## 2019-07-05 DIAGNOSIS — E11.9 TYPE 2 DIABETES MELLITUS WITHOUT COMPLICATION, WITHOUT LONG-TERM CURRENT USE OF INSULIN (H): ICD-10-CM

## 2019-07-05 LAB
ALBUMIN SERPL-MCNC: 3.8 G/DL (ref 3.4–5)
ALP SERPL-CCNC: 59 U/L (ref 40–150)
ALT SERPL W P-5'-P-CCNC: 37 U/L (ref 0–70)
ANION GAP SERPL CALCULATED.3IONS-SCNC: 8 MMOL/L (ref 3–14)
AST SERPL W P-5'-P-CCNC: 19 U/L (ref 0–45)
BILIRUB SERPL-MCNC: 0.7 MG/DL (ref 0.2–1.3)
BUN SERPL-MCNC: 23 MG/DL (ref 7–30)
CALCIUM SERPL-MCNC: 8.7 MG/DL (ref 8.5–10.1)
CHLORIDE SERPL-SCNC: 107 MMOL/L (ref 94–109)
CHOLEST SERPL-MCNC: 172 MG/DL
CO2 SERPL-SCNC: 25 MMOL/L (ref 20–32)
CREAT SERPL-MCNC: 0.92 MG/DL (ref 0.66–1.25)
CREAT UR-MCNC: 182 MG/DL
ERYTHROCYTE [DISTWIDTH] IN BLOOD BY AUTOMATED COUNT: 12.6 % (ref 10–15)
GFR SERPL CREATININE-BSD FRML MDRD: >90 ML/MIN/{1.73_M2}
GLUCOSE SERPL-MCNC: 178 MG/DL (ref 70–99)
HBA1C MFR BLD: 7.5 % (ref 0–5.6)
HCT VFR BLD AUTO: 43 % (ref 40–53)
HDLC SERPL-MCNC: 43 MG/DL
HGB BLD-MCNC: 15.7 G/DL (ref 13.3–17.7)
LDLC SERPL CALC-MCNC: 108 MG/DL
MCH RBC QN AUTO: 33.1 PG (ref 26.5–33)
MCHC RBC AUTO-ENTMCNC: 36.5 G/DL (ref 31.5–36.5)
MCV RBC AUTO: 91 FL (ref 78–100)
MICROALBUMIN UR-MCNC: 10 MG/L
MICROALBUMIN/CREAT UR: 5.6 MG/G CR (ref 0–17)
NONHDLC SERPL-MCNC: 129 MG/DL
PLATELET # BLD AUTO: 174 10E9/L (ref 150–450)
POTASSIUM SERPL-SCNC: 4.3 MMOL/L (ref 3.4–5.3)
PROT SERPL-MCNC: 7.2 G/DL (ref 6.8–8.8)
PSA SERPL-ACNC: 1.09 UG/L (ref 0–4)
RBC # BLD AUTO: 4.74 10E12/L (ref 4.4–5.9)
SODIUM SERPL-SCNC: 140 MMOL/L (ref 133–144)
TRIGL SERPL-MCNC: 103 MG/DL
TSH SERPL DL<=0.005 MIU/L-ACNC: 1.9 MU/L (ref 0.4–4)
WBC # BLD AUTO: 3.5 10E9/L (ref 4–11)

## 2019-07-05 PROCEDURE — 36415 COLL VENOUS BLD VENIPUNCTURE: CPT | Performed by: INTERNAL MEDICINE

## 2019-07-05 PROCEDURE — 82043 UR ALBUMIN QUANTITATIVE: CPT | Performed by: INTERNAL MEDICINE

## 2019-07-05 PROCEDURE — 85027 COMPLETE CBC AUTOMATED: CPT | Performed by: INTERNAL MEDICINE

## 2019-07-05 PROCEDURE — 83036 HEMOGLOBIN GLYCOSYLATED A1C: CPT | Performed by: INTERNAL MEDICINE

## 2019-07-05 PROCEDURE — 84443 ASSAY THYROID STIM HORMONE: CPT | Performed by: INTERNAL MEDICINE

## 2019-07-05 PROCEDURE — 80053 COMPREHEN METABOLIC PANEL: CPT | Performed by: INTERNAL MEDICINE

## 2019-07-05 PROCEDURE — 80061 LIPID PANEL: CPT | Performed by: INTERNAL MEDICINE

## 2019-07-05 PROCEDURE — G0103 PSA SCREENING: HCPCS | Performed by: INTERNAL MEDICINE

## 2019-07-05 PROCEDURE — 99214 OFFICE O/P EST MOD 30 MIN: CPT | Performed by: INTERNAL MEDICINE

## 2019-07-05 RX ORDER — ATORVASTATIN CALCIUM 40 MG/1
40 TABLET, FILM COATED ORAL DAILY
Qty: 90 TABLET | Refills: 3 | Status: SHIPPED | OUTPATIENT
Start: 2019-07-05 | End: 2019-10-18

## 2019-07-05 ASSESSMENT — MIFFLIN-ST. JEOR: SCORE: 1876.34

## 2019-07-05 NOTE — PROGRESS NOTES
Subjective     Joey Santillan is a 58 year old male who presents to clinic today for the following health issues:    HPI   Diabetes Follow-up      How often are you checking your blood sugar? Not at all    What time of day are you checking your blood sugars (select all that apply)?  Not checking     Have you had any blood sugars above 200?  Unknown    Have you had any blood sugars below 70?  No    What symptoms do you notice when your blood sugar is low?  None    What concerns do you have today about your diabetes? None     Do you have any of these symptoms? (Select all that apply)  No numbness or tingling in feet.  No redness, sores or blisters on feet.  No complaints of excessive thirst.  No reports of blurry vision.  No significant changes to weight.     Have you had a diabetic eye exam in the last 12 months? No    BP Readings from Last 2 Encounters:   07/05/19 106/72   09/10/18 122/85     Hemoglobin A1C (%)   Date Value   06/15/2018 7.4 (H)   10/16/2017 9.2 (H)     LDL Cholesterol Calculated (mg/dL)   Date Value   06/15/2018 104 (H)   09/28/2017 103 (H)       Diabetes Management Resources    Amount of exercise or physical activity: 2-3 days/week for an average of 30-45 minutes    Problems taking medications regularly: No    Medication side effects: none    Diet: regular (no restrictions), low salt and carbohydrate counting      Medication refills    Patient Active Problem List   Diagnosis     Overweight     Type 2 diabetes mellitus without complication, without long-term current use of insulin (H)     Hyperlipidemia LDL goal <100     Past Surgical History:   Procedure Laterality Date     COLONOSCOPY N/A 9/10/2018    Procedure: COLONOSCOPY;  colonoscopy;  Surgeon: Mallory Rudd MD;  Location:  GI       Social History     Tobacco Use     Smoking status: Current Some Day Smoker     Types: Cigars     Smokeless tobacco: Never Used     Tobacco comment: Cigar twice a month   Substance Use Topics      "Alcohol use: Yes     Comment: 0-4 drinks per week     Family History   Problem Relation Age of Onset     C.A.D. Father      Diabetes Mother      Alzheimer Disease Paternal Grandmother      Arthritis Maternal Grandmother      Cancer - colorectal No family hx of      Prostate Cancer No family hx of          Current Outpatient Medications   Medication Sig Dispense Refill     ACE/ARB/ARNI NOT PRESCRIBED, INTENTIONAL, Please choose reason not prescribed, below       aspirin (ASA) 81 MG EC tablet Take 1 tablet by mouth daily 90 tablet 2     atorvastatin (LIPITOR) 40 MG tablet Take 1 tablet (40 mg) by mouth daily 90 tablet 3     blood glucose monitoring (ONE TOUCH DELICA) lancets Use to test blood sugar 1 times daily or as directed.  Ok to substitute alternative if insurance prefers. 100 each 6     blood glucose monitoring (ONE TOUCH VERIO IQ) test strip Use to test blood sugars 1 times daily or as directed. 50 each 6     metFORMIN (GLUCOPHAGE) 500 MG tablet Take 1 tablet (500 mg) by mouth 2 times daily (with meals) 180 tablet 3     No Known Allergies      Reviewed and updated as needed this visit by Provider  Tobacco  Med Hx  Surg Hx  Fam Hx  Soc Hx        Review of Systems   ROS COMP: Constitutional, HEENT, cardiovascular, pulmonary, gi and gu systems are negative, except as otherwise noted.      Objective    /72 (BP Location: Right arm, Cuff Size: Adult Large)   Pulse 59   Temp 97  F (36.1  C) (Oral)   Ht 1.88 m (6' 2\")   Wt 97.7 kg (215 lb 4.8 oz)   SpO2 97%   BMI 27.64 kg/m    Body mass index is 27.64 kg/m .  Physical Exam   GENERAL: healthy, alert and no distress  EYES: Eyes grossly normal to inspection, PERRL and conjunctivae and sclerae normal  HENT: ear canals and TM's normal, nose and mouth without ulcers or lesions  NECK: no adenopathy, no asymmetry, masses, or scars and thyroid normal to palpation  RESP: lungs clear to auscultation - no rales, rhonchi or wheezes  CV: regular rate and rhythm, " "normal S1 S2, no S3 or S4, no murmur, click or rub, no peripheral edema and peripheral pulses strong  ABDOMEN: soft, nontender, no hepatosplenomegaly, no masses and bowel sounds normal  MS: no gross musculoskeletal defects noted, no edema  SKIN: no suspicious lesions or rashes  NEURO: Normal strength and tone, mentation intact and speech normal  PSYCH: mentation appears normal, affect normal/bright  Diabetic foot exam: normal DP and PT pulses, no trophic changes or ulcerative lesions and normal sensory exam    Diagnostic Test Results:  Labs pending         Assessment & Plan     1. Type 2 diabetes mellitus without complication, without long-term current use of insulin (H)  Over due for A1c check  Discussed need for at least every 6 months checking and every 3 month checking until A1c well controlled  Reminded him to schedule eye appointment   Diet and exercise considerations discussed  - TSH WITH FREE T4 REFLEX  - Albumin Random Urine Quantitative with Creat Ratio  - HEMOGLOBIN A1C    2. Hyperlipidemia LDL goal <100  Discussed rationale for primary prevention  He would like to start atorvastatin (Lipitor) for primary prevention after discussion of risks side effects etc   - Lipid panel reflex to direct LDL Fasting  - CBC with platelets  - Comprehensive metabolic panel  - atorvastatin (LIPITOR) 40 MG tablet; Take 1 tablet (40 mg) by mouth daily  Dispense: 90 tablet; Refill: 3    3. Prostate cancer screening  After discussion of pros and cons of screening he would like this drawn   - Prostate spec antigen screen     Tobacco Cessation:   reports that he has been smoking cigars.  He has never used smokeless tobacco.  Tobacco Cessation Action Plan: Self help information given to patient      BMI:   Estimated body mass index is 27.64 kg/m  as calculated from the following:    Height as of this encounter: 1.88 m (6' 2\").    Weight as of this encounter: 97.7 kg (215 lb 4.8 oz).   Weight management plan: Discussed healthy " diet and exercise guidelines      I recommended shingrix, he will consider       Return in about 3 months (around 10/5/2019) for Preventive Visit.    Narciso Cisneros MD  [facility]

## 2019-07-05 NOTE — RESULT ENCOUNTER NOTE
"The following letter pertains to your most recent diagnostic tests:    .nmicroj     -Your prostate specific antigen (PSA) test result returned normal.     -TSH (thyroid stimulating hormone) level is normal which indicates normal circulating thyroid hormone levels.      -Your total cholesterol is 172 which is at your goal of total cholesterol less than 200.    -Your triglycerides are 103 which are at your goal of triglycerides less than 150.    -Your HDL or \"good cholesterol\" is 43 which is at your goal of HDL cholesterol greater than 40.    -Your LDL cholesterol or \"bad cholesterol\" is 108 which is above your goal of LDL cholesterol less than <100.  Your LDL goal is based on your risk factors for artery disease including her history of diabetes.    -Liver and gallbladder tests are normal for you. (ALT,AST, Alk phos, bilirubin), kidney function is normal for you (Creatinine, GFR), Sodium is normal, Potassium is normal for you, Calcium is normal for you, Glucose (blood sugar) is elevated.    -Your complete blood counts including your hemoglobin returned stable  for you.       -Your hemoglobin A1c test which is a diabetes blood test that represents and average of your blood sugars over the last 3 months returned at 7.5 which is slightly worse than last visit and above your goal of hemoglobin A1c less than 7.    Bottom line: Based on these results, I would recommend starting the atorvastatin (Lipitor) to get better control of your cholesterol.  Also, I would recommend increasing the dose of metformin from 500 mg twice daily to 1000 mg twice daily.  I did send prescription for 1000 mg tablets to your pharmacy.  You can certainly use up your 500 mg tablets by taking 2 tablets twice daily until you run out of pills.  I do recommend that we recheck the hemoglobin A1c in a 3-month time interval.  We can recheck the cholesterol at that time as well.      Follow up: Schedule an office visit with a hemoglobin A1c check for 3 " months from now.  You can schedule a laboratory appointment for before the visit so that the hemoglobin A1c result is available for our discussion prior to our meeting.      Sincerely,    Dr. Cisneros

## 2019-07-05 NOTE — PATIENT INSTRUCTIONS
"You should get the new shingles vaccine series \"SHINGRIX\" (not Zostavax) at a pharmacy or here.    "

## 2019-07-06 NOTE — TELEPHONE ENCOUNTER
metFORMIN (GLUCOPHAGE) 500 MG tablet  Last Written Prescription Date:  6/15/18  Last Fill Quantity: 180 tablet,  # refills: 3   Last office visit: 7/5/2019 with prescribing provider:  Blayne Whiteside Office Visit:      aspirin (ASA) 81 MG EC tablet  Last Written Prescription Date:  12/20/18  Last Fill Quantity: 90 tablet,  # refills: 2   Last office visit: 7/5/2019 with prescribing provider:  Blayne Whiteside Office Visit:        Requested Prescriptions   Pending Prescriptions Disp Refills     metFORMIN (GLUCOPHAGE) 500 MG tablet 180 tablet 3     Sig: Take 1 tablet (500 mg) by mouth 2 times daily (with meals)       Biguanide Agents Failed - 7/5/2019 12:27 PM        Failed - Patient has documented LDL within the past 12 mos.     Recent Labs   Lab Test 07/05/19  0945   *             Failed - Patient has had a Microalbumin in the past 15 mos.     Recent Labs   Lab Test 07/05/19  0945   MICROL 10   UMALCR 5.60             Failed - Patient's CR is NOT>1.4 OR Patient's EGFR is NOT<45 within past 12 mos.     Recent Labs   Lab Test 07/05/19  0945   GFRESTIMATED >90   GFRESTBLACK >90       Recent Labs   Lab Test 07/05/19  0945   CR 0.92             Passed - Blood pressure less than 140/90 in past 6 months     BP Readings from Last 3 Encounters:   07/05/19 106/72   09/10/18 122/85   06/15/18 118/80                 Passed - Patient is age 10 or older        Passed - Patient has documented A1c within the specified period of time.     If HgbA1C is 8 or greater, it needs to be on file within the past 3 months.  If less than 8, must be on file within the past 6 months.     Recent Labs   Lab Test 07/05/19  0945   A1C 7.5*             Passed - Patient does NOT have a diagnosis of CHF.        Passed - Medication is active on med list        Passed - Recent (6 mo) or future (30 days) visit within the authorizing provider's specialty     Patient had office visit in the last 6 months or has a visit in the next 30 days with  "authorizing provider or within the authorizing provider's specialty.  See \"Patient Info\" tab in inbasket, or \"Choose Columns\" in Meds & Orders section of the refill encounter.            aspirin (ASA) 81 MG EC tablet 90 tablet 2     Sig: Take 1 tablet (81 mg) by mouth daily       Analgesics (Non-Narcotic Tylenol and ASA Only) Passed - 7/5/2019 12:27 PM        Passed - Recent (12 mo) or future (30 days) visit within the authorizing provider's specialty     Patient had office visit in the last 12 months or has a visit in the next 30 days with authorizing provider or within the authorizing provider's specialty.  See \"Patient Info\" tab in inbasket, or \"Choose Columns\" in Meds & Orders section of the refill encounter.              Passed - Patient is age 20 years or older     If ASA is flagged for ages under 20 years old. Forward to provider for confirmation Ryes Syndrome is not a concern.              Passed - Medication is active on med list          "

## 2019-07-08 NOTE — TELEPHONE ENCOUNTER
"Aspirin - Prescription approved per [facility] [place].    Metformin - Prescription approved per [facility] [place].  Kathi ABRAHAM RN      Requested Prescriptions   Pending Prescriptions Disp Refills     metFORMIN (GLUCOPHAGE) 500 MG tablet 180 tablet 3     Sig: Take 1 tablet (500 mg) by mouth 2 times daily (with meals)       Biguanide Agents Passed - 7/5/2019  7:09 PM        Passed - Blood pressure less than 140/90 in past 6 months     BP Readings from Last 3 Encounters:   07/05/19 106/72   09/10/18 122/85   06/15/18 118/80                 Passed - Patient has documented LDL within the past 12 mos.     Recent Labs   Lab Test 07/05/19  0945   *             Passed - Patient has had a Microalbumin in the past 15 mos.     Recent Labs   Lab Test 07/05/19  0945   MICROL 10   UMALCR 5.60             Passed - Patient is age 10 or older        Passed - Patient has documented A1c within the specified period of time.     If HgbA1C is 8 or greater, it needs to be on file within the past 3 months.  If less than 8, must be on file within the past 6 months.     Recent Labs   Lab Test 07/05/19  0945   A1C 7.5*             Passed - Patient's CR is NOT>1.4 OR Patient's EGFR is NOT<45 within past 12 mos.     Recent Labs   Lab Test 07/05/19  0945   GFRESTIMATED >90   GFRESTBLACK >90       Recent Labs   Lab Test 07/05/19  0945   CR 0.92             Passed - Patient does NOT have a diagnosis of CHF.        Passed - Medication is active on med list        Passed - Recent (6 mo) or future (30 days) visit within the authorizing provider's specialty     Patient had office visit in the last 6 months or has a visit in the next 30 days with authorizing provider or within the authorizing provider's specialty.  See \"Patient Info\" tab in inbasket, or \"Choose Columns\" in Meds & Orders section of the refill encounter.          Signed Prescriptions Disp Refills    aspirin (ASA) 81 MG EC tablet 90 tablet 0     Sig: Take 1 tablet (81 mg) by " "mouth daily       Analgesics (Non-Narcotic Tylenol and ASA Only) Passed - 7/5/2019  7:09 PM        Passed - Recent (12 mo) or future (30 days) visit within the authorizing provider's specialty     Patient had office visit in the last 12 months or has a visit in the next 30 days with authorizing provider or within the authorizing provider's specialty.  See \"Patient Info\" tab in inbasket, or \"Choose Columns\" in Meds & Orders section of the refill encounter.              Passed - Patient is age 20 years or older     If ASA is flagged for ages under 20 years old. Forward to provider for confirmation Ryes Syndrome is not a concern.              Passed - Medication is active on med list          "

## 2019-07-25 ENCOUNTER — TRANSFERRED RECORDS (OUTPATIENT)
Dept: HEALTH INFORMATION MANAGEMENT | Facility: CLINIC | Age: 59
End: 2019-07-25

## 2019-09-30 DIAGNOSIS — E11.9 TYPE 2 DIABETES MELLITUS WITHOUT COMPLICATION, WITHOUT LONG-TERM CURRENT USE OF INSULIN (H): ICD-10-CM

## 2019-09-30 NOTE — TELEPHONE ENCOUNTER
"metFORMIN (GLUCOPHAGE) 500 MG tablet 180 tablet 0 7/8/2019  No   Sig - Route: Take 1 tablet (500 mg) by mouth 2 times daily (with meals)        aspirin (ASA) 81 MG EC tablet 90 tablet 0 7/8/2019  No   Sig - Route: Take 1 tablet (81 mg) by mouth daily        Last Written Prescription Date:  07/08/2019  Last Fill Quantity: 90,  # refills: 0   Last office visit: 7/5/2019 with prescribing provider:     Future Office Visit:   Next 5 appointments (look out 90 days)    Oct 18, 2019  8:00 AM CDT  PH[facility] [address] [facility] [address] [phone]         Requested Prescriptions   Pending Prescriptions Disp Refills     ASPIRIN ADULT LOW STRENGTH 81 MG EC tablet [Pharmacy Med Name: Aspirin Adult Low Strength Oral Tablet Delayed Release 81 MG] 90 tablet 0     Sig: Take 1 tablet (81 mg) by mouth daily       Analgesics (Non-Narcotic Tylenol and ASA Only) Passed - 9/30/2019  7:00 AM        Passed - Recent (12 mo) or future (30 days) visit within the authorizing provider's specialty     Patient has had an office visit with the authorizing provider or a provider within the authorizing providers department within the previous 12 mos or has a future within next 30 days. See \"Patient Info\" tab in inbasket, or \"Choose Columns\" in Meds & Orders section of the refill encounter.              Passed - Patient is age 20 years or older     If ASA is flagged for ages under 20 years old. Forward to provider for confirmation Ryes Syndrome is not a concern.              Passed - Medication is active on med list        metFORMIN (GLUCOPHAGE) 500 MG tablet [Pharmacy Med Name: metFORMIN HCl Oral Tablet 500 MG] 180 tablet 0     Sig: Take 1 tablet (500 mg) by mouth 2 times daily (with meals)       Biguanide Agents Passed - 9/30/2019  7:00 AM        Passed - Blood pressure less than 140/90 in past 6 months     BP Readings from Last 3 Encounters:   07/05/19 106/72 " "  09/10/18 122/85   06/15/18 118/80                 Passed - Patient has documented LDL within the past 12 mos.     Recent Labs   Lab Test 07/05/19  0945   *             Passed - Patient has had a Microalbumin in the past 15 mos.     Recent Labs   Lab Test 07/05/19  0945   MICROL 10   UMALCR 5.60             Passed - Patient is age 10 or older        Passed - Patient has documented A1c within the specified period of time.     If HgbA1C is 8 or greater, it needs to be on file within the past 3 months.  If less than 8, must be on file within the past 6 months.     Recent Labs   Lab Test 07/05/19  0945   A1C 7.5*             Passed - Patient's CR is NOT>1.4 OR Patient's EGFR is NOT<45 within past 12 mos.     Recent Labs   Lab Test 07/05/19  0945   GFRESTIMATED >90   GFRESTBLACK >90       Recent Labs   Lab Test 07/05/19  0945   CR 0.92             Passed - Patient does NOT have a diagnosis of CHF.        Passed - Medication is active on med list        Passed - Recent (6 mo) or future (30 days) visit within the authorizing provider's specialty     Patient had office visit in the last 6 months or has a visit in the next 30 days with authorizing provider or within the authorizing provider's specialty.  See \"Patient Info\" tab in inbasket, or \"Choose Columns\" in Meds & Orders section of the refill encounter.              "

## 2019-10-01 RX ORDER — ASPIRIN 81 MG/1
TABLET ORAL
Qty: 90 TABLET | Refills: 1 | Status: SHIPPED | OUTPATIENT
Start: 2019-10-01 | End: 2020-02-21

## 2019-10-01 NOTE — TELEPHONE ENCOUNTER
Prescription approved per [facility] Refill Protocol.    Jaun YANCEY RN

## 2019-10-02 ENCOUNTER — HEALTH MAINTENANCE LETTER (OUTPATIENT)
Age: 59
End: 2019-10-02

## 2019-10-18 ENCOUNTER — OFFICE VISIT (OUTPATIENT)
Dept: FAMILY MEDICINE | Facility: CLINIC | Age: 59
End: 2019-10-18
Payer: COMMERCIAL

## 2019-10-18 VITALS
OXYGEN SATURATION: 98 % | HEIGHT: 74 IN | SYSTOLIC BLOOD PRESSURE: 126 MMHG | DIASTOLIC BLOOD PRESSURE: 87 MMHG | HEART RATE: 67 BPM | BODY MASS INDEX: 27.08 KG/M2 | WEIGHT: 211 LBS | TEMPERATURE: 96.8 F

## 2019-10-18 DIAGNOSIS — Z00.00 ROUTINE GENERAL MEDICAL EXAMINATION AT A HEALTH CARE FACILITY: Primary | ICD-10-CM

## 2019-10-18 DIAGNOSIS — E78.5 HYPERLIPIDEMIA LDL GOAL <100: ICD-10-CM

## 2019-10-18 DIAGNOSIS — Z78.9 STATIN INTOLERANCE: ICD-10-CM

## 2019-10-18 DIAGNOSIS — E11.9 TYPE 2 DIABETES MELLITUS WITHOUT COMPLICATION, WITHOUT LONG-TERM CURRENT USE OF INSULIN (H): ICD-10-CM

## 2019-10-18 PROCEDURE — 99396 PREV VISIT EST AGE 40-64: CPT | Performed by: INTERNAL MEDICINE

## 2019-10-18 RX ORDER — ROSUVASTATIN CALCIUM 5 MG/1
5 TABLET, COATED ORAL DAILY
Qty: 90 TABLET | Refills: 3 | Status: SHIPPED | OUTPATIENT
Start: 2019-10-18 | End: 2020-09-14

## 2019-10-18 ASSESSMENT — MIFFLIN-ST. JEOR: SCORE: 1851.84

## 2019-10-18 NOTE — PROGRESS NOTES
SUBJECTIVE:   CC: Joey Santillan is an 59 year old male who presents for preventative health visit.     Healthy Habits:     Getting at least 3 servings of Calcium per day:  Yes    Bi-annual eye exam:  Yes    Dental care twice a year:  Yes    Sleep apnea or symptoms of sleep apnea:  None    Diet:  Carbohydrate counting    Frequency of exercise:  2-3 days/week    Duration of exercise:  Less than 15 minutes    Taking medications regularly:  Yes    Medication side effects:  Other    PHQ-2 Total Score: 0    Additional concerns today:  Yes    Stopped atorvastatin (Lipitor) due to libido side effects   Symptoms resolved when he stopped atorvastatin (Lipitor)       Today's PHQ-2 Score:   PHQ-2 ( 1999 [employer]) 10/15/2019   Q1: Little interest or pleasure in doing things 0   Q2: Feeling down, depressed or hopeless 0   PHQ-2 Score 0   Q1: Little interest or pleasure in doing things Not at all   Q2: Feeling down, depressed or hopeless Not at all   PHQ-2 Score 0       Abuse: Current or Past(Physical, Sexual or Emotional)- No  Do you feel safe in your environment? Yes    Social History     Tobacco Use     Smoking status: Current Some Day Smoker     Packs/day: 0.00     Years: 5.00     Pack years: 0.00     Types: Cigars     Smokeless tobacco: Never Used     Tobacco comment: Cigar twice a month   Substance Use Topics     Alcohol use: Yes     Alcohol/week: 0.0 - 4.0 standard drinks     Comment: very light     If you drink alcohol do you typically have >3 drinks per day or >7 drinks per week? No    Alcohol Use 10/18/2019   Prescreen: >3 drinks/day or >7 drinks/week? -   Prescreen: >3 drinks/day or >7 drinks/week? No       Last PSA:   PSA   Date Value Ref Range Status   07/05/2019 1.09 0 - 4 ug/L Final     Comment:     Assay Method:  Chemiluminescence using Siemens Vista analyzer       Reviewed orders with patient. Reviewed health maintenance and updated orders accordingly - Yes  Patient Active Problem List   Diagnosis      Overweight     Type 2 diabetes mellitus without complication, without long-term current use of insulin (H)     Hyperlipidemia LDL goal <100     Past Surgical History:   Procedure Laterality Date     COLONOSCOPY N/A 9/10/2018    Procedure: COLONOSCOPY;  colonoscopy;  Surgeon: Mallory Rudd MD;  Location:  GI       Social History     Tobacco Use     Smoking status: Current Some Day Smoker     Packs/day: 0.00     Years: 5.00     Pack years: 0.00     Types: Cigars     Smokeless tobacco: Never Used     Tobacco comment: Cigar twice a month   Substance Use Topics     Alcohol use: Yes     Alcohol/week: 0.0 - 4.0 standard drinks     Comment: very light     Family History   Problem Relation Age of Onset     C.A.D. Father      Diabetes Mother      Alzheimer Disease Paternal Grandmother      Arthritis Maternal Grandmother      Diabetes Other      Cancer - colorectal No family hx of      Prostate Cancer No family hx of          Current Outpatient Medications   Medication Sig Dispense Refill     ACE/ARB/ARNI NOT PRESCRIBED, INTENTIONAL, Please choose reason not prescribed, below       ASPIRIN ADULT LOW STRENGTH 81 MG EC tablet Take 1 tablet (81 mg) by mouth daily 90 tablet 1     atorvastatin (LIPITOR) 40 MG tablet Take 1 tablet (40 mg) by mouth daily 90 tablet 3     blood glucose monitoring (ONE TOUCH DELICA) lancets Use to test blood sugar 1 times daily or as directed.  Ok to substitute alternative if insurance prefers. 100 each 6     blood glucose monitoring (ONE TOUCH VERIO IQ) test strip Use to test blood sugars 1 times daily or as directed. 50 each 6     metFORMIN (GLUCOPHAGE) 500 MG tablet Take 1 tablet (500 mg) by mouth 2 times daily (with meals) 180 tablet 1     No Known Allergies    Reviewed and updated as needed this visit by clinical staff  Tobacco  Soc Hx        Reviewed and updated as needed this visit by Provider        Past Medical History:   Diagnosis Date     Hyperlipidemia LDL goal <100 10/23/2017      "Pilonidal cyst      Type 2 diabetes mellitus without complication, without long-term current use of insulin (H) 10/23/2017      Past Surgical History:   Procedure Laterality Date     COLONOSCOPY N/A 9/10/2018    Procedure: COLONOSCOPY;  colonoscopy;  Surgeon: Mallory Rudd MD;  Location:  GI       Review of Systems  A 10 organ systems ROS is negative.     OBJECTIVE:   /87 (BP Location: Right arm, Patient Position: Chair, Cuff Size: Adult Regular)   Pulse 67   Temp 96.8  F (36  C) (Tympanic)   Ht 1.88 m (6' 2\")   Wt 95.7 kg (211 lb)   SpO2 98%   BMI 27.09 kg/m      Physical Exam  GENERAL: healthy, alert and no distress  EYES: Eyes grossly normal to inspection, PERRL and conjunctivae and sclerae normal  HENT: ear canals and TM's normal, nose and mouth without ulcers or lesions  NECK: no adenopathy, no asymmetry, masses, or scars and thyroid normal to palpation  RESP: lungs clear to auscultation - no rales, rhonchi or wheezes  CV: regular rate and rhythm, normal S1 S2, no S3 or S4, no murmur, click or rub, no peripheral edema and peripheral pulses strong  ABDOMEN: soft, nontender, no hepatosplenomegaly, no masses and bowel sounds normal  MS: no gross musculoskeletal defects noted, no edema  SKIN: no suspicious lesions or rashes  NEURO: Normal strength and tone, mentation intact and speech normal  PSYCH: mentation appears normal, affect normal/bright    Diagnostic Test Results:  Labs reviewed in Epic  He had an A1c of 6.8 in the beginning of October by his report via a [employer] [facility] diabetes study in which he is enrolled     ASSESSMENT/PLAN:   1. Routine general medical examination at a health care facility      2. Type 2 diabetes mellitus without complication, without long-term current use of insulin (H)  Improved control with diet interventions per endocrine study; continue current metformin 500 twice daily and recheck A1c in 3 months   - Hemoglobin A1c; Future    3. Hyperlipidemia LDL goal <100  Try " "Crestor in lieu of atorvastatin (Lipitor) and recheck lipids in 3 months   - rosuvastatin (CRESTOR) 5 MG tablet; Take 1 tablet (5 mg) by mouth daily  Dispense: 90 tablet; Refill: 3  - Lipid panel reflex to direct LDL Fasting; Future    4. Statin intolerance  See discussion above       COUNSELING:   Reviewed preventive health counseling, as reflected in patient instructions  Special attention given to:        Regular exercise       Healthy diet/nutrition       Immunizations    He declines flu shot, recommended shingrix             Consider Hep C screening for patients born between 1945 and 1965; done        HIV screeninx in teen years, 1x in adult years, and at intervals if high risk; done       Colon cancer screening; repeat in        Prostate cancer screening; PSA was done in July    Estimated body mass index is 27.09 kg/m  as calculated from the following:    Height as of this encounter: 1.88 m (6' 2\").    Weight as of this encounter: 95.7 kg (211 lb).     Weight management plan: Discussed healthy diet and exercise guidelines     reports that he has been smoking cigars. He has been smoking about 0.00 packs per day for the past 5.00 years. He has never used smokeless tobacco.     He was counseled against tobacco use.  He has not had enough tobacco exposure to warrant lung cancer screening.         Counseling Resources:  ATP IV Guidelines  Pooled Cohorts Equation Calculator  FRAX Risk Assessment  ICSI Preventive Guidelines  Dietary Guidelines for Americans,   USDA's MyPlate  ASA Prophylaxis  Lung CA Screening    Narciso Cisneros MD  [facility]  "

## 2020-02-21 ENCOUNTER — OFFICE VISIT (OUTPATIENT)
Dept: FAMILY MEDICINE | Facility: CLINIC | Age: 60
End: 2020-02-21
Payer: COMMERCIAL

## 2020-02-21 VITALS
HEART RATE: 67 BPM | BODY MASS INDEX: 26.82 KG/M2 | SYSTOLIC BLOOD PRESSURE: 108 MMHG | TEMPERATURE: 97.6 F | WEIGHT: 209 LBS | OXYGEN SATURATION: 98 % | DIASTOLIC BLOOD PRESSURE: 73 MMHG | HEIGHT: 74 IN

## 2020-02-21 DIAGNOSIS — E78.5 HYPERLIPIDEMIA LDL GOAL <100: ICD-10-CM

## 2020-02-21 DIAGNOSIS — E11.9 TYPE 2 DIABETES MELLITUS WITHOUT COMPLICATION, WITHOUT LONG-TERM CURRENT USE OF INSULIN (H): Primary | ICD-10-CM

## 2020-02-21 LAB
CHOLEST SERPL-MCNC: 123 MG/DL
HBA1C MFR BLD: 6.9 % (ref 0–5.6)
HDLC SERPL-MCNC: 46 MG/DL
LDLC SERPL CALC-MCNC: 61 MG/DL
NONHDLC SERPL-MCNC: 77 MG/DL
TRIGL SERPL-MCNC: 82 MG/DL

## 2020-02-21 PROCEDURE — 36415 COLL VENOUS BLD VENIPUNCTURE: CPT | Performed by: INTERNAL MEDICINE

## 2020-02-21 PROCEDURE — 80061 LIPID PANEL: CPT | Performed by: INTERNAL MEDICINE

## 2020-02-21 PROCEDURE — 99214 OFFICE O/P EST MOD 30 MIN: CPT | Performed by: INTERNAL MEDICINE

## 2020-02-21 PROCEDURE — 83036 HEMOGLOBIN GLYCOSYLATED A1C: CPT | Performed by: INTERNAL MEDICINE

## 2020-02-21 ASSESSMENT — MIFFLIN-ST. JEOR: SCORE: 1842.77

## 2020-02-21 NOTE — PROGRESS NOTES
Subjective     Joey Santillan is a 59 year old male who presents to clinic today for the following health issues:    HPI   Diabetes Follow-up    How often are you checking your blood sugar? Continuous glucose monitor  What time of day are you checking your blood sugars (select all that apply)?  Before meals  Have you had any blood sugars above 200?  No  Have you had any blood sugars below 70?  No    What symptoms do you notice when your blood sugar is low?  None    What concerns do you have today about your diabetes? None     Do you have any of these symptoms? (Select all that apply)  No numbness or tingling in feet.  No redness, sores or blisters on feet.  No complaints of excessive thirst.  No reports of blurry vision.  No significant changes to weight.      BP Readings from Last 2 Encounters:   02/21/20 108/73   10/18/19 126/87     Hemoglobin A1C (%)   Date Value   02/21/2020 6.9 (H)   07/05/2019 7.5 (H)     LDL Cholesterol Calculated (mg/dL)   Date Value   07/05/2019 108 (H)   06/15/2018 104 (H)           How many servings of fruits and vegetables do you eat daily?  4 or more    On average, how many sweetened beverages do you drink each day (Examples: soda, juice, sweet tea, etc.  Do NOT count diet or artificially sweetened beverages)?   0    How many days per week do you exercise enough to make your heart beat faster? 4    How many minutes a day do you exercise enough to make your heart beat faster? 30 - 60    How many days per week do you miss taking your medication? 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   Using continuous glucose monitor   Tolerating Crestor without issues     Patient Active Problem List   Diagnosis     Overweight     Type 2 diabetes mellitus without complication, without long-term current use of insulin (H)     Hyperlipidemia LDL goal <100     Past Surgical History:   Procedure Laterality Date     COLONOSCOPY N/A 9/10/2018    Procedure:  "COLONOSCOPY;  colonoscopy;  Surgeon: Mallory Rudd MD;  Location:  GI       Social History     Tobacco Use     Smoking status: Former Smoker     Packs/day: 0.00     Years: 5.00     Pack years: 0.00     Types: Cigars     Smokeless tobacco: Never Used   Substance Use Topics     Alcohol use: Yes     Alcohol/week: 0.0 - 4.0 standard drinks     Comment: very light     Family History   Problem Relation Age of Onset     C.A.D. Father      Diabetes Mother      Alzheimer Disease Paternal Grandmother      Arthritis Maternal Grandmother      Diabetes Other      Cancer - colorectal No family hx of      Prostate Cancer No family hx of          Current Outpatient Medications   Medication Sig Dispense Refill     ACE/ARB/ARNI NOT PRESCRIBED, INTENTIONAL, Please choose reason not prescribed, below       aspirin (ASPIRIN ADULT LOW STRENGTH) 81 MG PO EC tablet Take 1 tablet (81 mg) by mouth daily 90 tablet 1     blood glucose monitoring (ONE TOUCH DELICA) lancets Use to test blood sugar 1 times daily or as directed.  Ok to substitute alternative if insurance prefers. 100 each 6     blood glucose monitoring (ONE TOUCH VERIO IQ) test strip Use to test blood sugars 1 times daily or as directed. 50 each 6     metFORMIN (GLUCOPHAGE) 500 MG tablet Take 1 tablet (500 mg) by mouth 2 times daily (with meals) 180 tablet 1     rosuvastatin (CRESTOR) 5 MG tablet Take 1 tablet (5 mg) by mouth daily 90 tablet 3     No Known Allergies      Reviewed and updated as needed this visit by Provider  Tobacco  Med Hx  Surg Hx  Fam Hx  Soc Hx        Review of Systems   ROS COMP: Constitutional, HEENT, cardiovascular, pulmonary, gi and gu systems are negative, except as otherwise noted.      Objective    /73 (BP Location: Right arm, Patient Position: Chair, Cuff Size: Adult Regular)   Pulse 67   Temp 97.6  F (36.4  C) (Oral)   Ht 1.88 m (6' 2\")   Wt 94.8 kg (209 lb)   SpO2 98%   BMI 26.83 kg/m    Body mass index is 26.83 " kg/m .  Physical Exam   GENERAL: healthy, alert and no distress  PSYCH: mentation appears normal, affect normal/bright  Diabetic foot exam: normal DP and PT pulses, no trophic changes or ulcerative lesions and normal sensory exam    A1c 6.9  Lipid panel pending         Assessment & Plan     1. Type 2 diabetes mellitus without complication, without long-term current use of insulin (H)  Now under good control  - aspirin (ASPIRIN ADULT LOW STRENGTH) 81 MG PO EC tablet; Take 1 tablet (81 mg) by mouth daily  Dispense: 90 tablet; Refill: 1  - Hemoglobin A1c    2. Hyperlipidemia LDL goal <100  On statin therapy and tolerating   Check lipids   - Lipid panel reflex to direct LDL Fasting           Return in about 6 months (around 8/21/2020) for Diabetes Check.    Narciso Cisneros MD  [facility]

## 2020-02-21 NOTE — LETTER
[facility]  [address] [place] [address] [facility]  [address]  [place]  [address]  Tel: [phone]    February 21, 2020    Joey Bender Tyrell  [address] [facility] [address]        Dear Vicky Hernandezprabhakar,    The following letter pertains to your most recent diagnostic tests:     -Your cholesterol panel looks healthy.       Bottom line:  Keep taking the rosuvastatin as you are.       Follow up:  Office visit appointment in 6 months or return sooner as needed if new symptoms or problems develop.       Sincerely,     Dr. Cisneros         Enclosure: Lab Results  Results for orders placed or performed in visit on 02/21/20   Hemoglobin A1c     Status: Abnormal   Result Value Ref Range    Hemoglobin A1C 6.9 (H) 0 - 5.6 %   Lipid panel reflex to direct LDL Fasting     Status: None   Result Value Ref Range    Cholesterol 123 <200 mg/dL    Triglycerides 82 <150 mg/dL    HDL Cholesterol 46 >39 mg/dL    LDL Cholesterol Calculated 61 <100 mg/dL    Non HDL Cholesterol 77 <130 mg/dL

## 2020-02-21 NOTE — RESULT ENCOUNTER NOTE
The following letter pertains to your most recent diagnostic tests:    -Your cholesterol panel looks healthy.       Bottom line:  Keep taking the rosuvastatin as you are.      Follow up:  Office visit appointment in 6 months or return sooner as needed if new symptoms or problems develop.      Sincerely,    Dr. Cisneros

## 2020-03-16 ENCOUNTER — MYC REFILL (OUTPATIENT)
Dept: FAMILY MEDICINE | Facility: CLINIC | Age: 60
End: 2020-03-16

## 2020-03-16 DIAGNOSIS — E11.9 TYPE 2 DIABETES MELLITUS WITHOUT COMPLICATION, WITHOUT LONG-TERM CURRENT USE OF INSULIN (H): ICD-10-CM

## 2020-03-17 NOTE — TELEPHONE ENCOUNTER
"Requested Prescriptions   Pending Prescriptions Disp Refills     metFORMIN (GLUCOPHAGE) 500 MG tablet [Pharmacy Med Name: metFORMIN HCl Oral Tablet 500 MG]  Last Written Prescription Date:  10/1/19  Last Fill Quantity: 180,  # refills: 1   Last office visit: 2/21/2020 with prescribing provider:  Balyne   Future Office Visit:     180 tablet 0     Sig: TAKE 1 TABLET BY MOUTH TWICE A DAY WITH MEALS       Biguanide Agents Passed - 3/16/2020  2:39 PM        Passed - Blood pressure less than 140/90 in past 6 months     BP Readings from Last 3 Encounters:   02/21/20 108/73   10/18/19 126/87   07/05/19 106/72                 Passed - Patient has documented LDL within the past 12 mos.     Recent Labs   Lab Test 02/21/20  0835   LDL 61             Passed - Patient has had a Microalbumin in the past 15 mos.     Recent Labs   Lab Test 07/05/19  0945   MICROL 10   UMALCR 5.60             Passed - Patient is age 10 or older        Passed - Patient has documented A1c within the specified period of time.     If HgbA1C is 8 or greater, it needs to be on file within the past 3 months.  If less than 8, must be on file within the past 6 months.     Recent Labs   Lab Test 02/21/20  0835   A1C 6.9*             Passed - Patient's CR is NOT>1.4 OR Patient's EGFR is NOT<45 within past 12 mos.     Recent Labs   Lab Test 07/05/19  0945   GFRESTIMATED >90   GFRESTBLACK >90       Recent Labs   Lab Test 07/05/19  0945   CR 0.92             Passed - Patient does NOT have a diagnosis of CHF.        Passed - Medication is active on med list        Passed - Recent (6 mo) or future (30 days) visit within the authorizing provider's specialty     Patient had office visit in the last 6 months or has a visit in the next 30 days with authorizing provider or within the authorizing provider's specialty.  See \"Patient Info\" tab in inbasket, or \"Choose Columns\" in Meds & Orders section of the refill encounter.                 "

## 2020-05-13 ENCOUNTER — TELEPHONE (OUTPATIENT)
Dept: BEHAVIORAL HEALTH | Facility: CLINIC | Age: 60
End: 2020-05-13

## 2020-09-15 DIAGNOSIS — E11.9 TYPE 2 DIABETES MELLITUS WITHOUT COMPLICATION, WITHOUT LONG-TERM CURRENT USE OF INSULIN (H): ICD-10-CM

## 2020-09-15 NOTE — TELEPHONE ENCOUNTER
A 30 day supply is given, patient is due for an office visit.  Patient has appointment scheduled for 9/17/2020 with provider.    FRED Castaneda, RN  [employer] [facility]

## 2020-12-20 DIAGNOSIS — E78.5 HYPERLIPIDEMIA LDL GOAL <100: ICD-10-CM

## 2020-12-21 DIAGNOSIS — E11.9 TYPE 2 DIABETES MELLITUS WITHOUT COMPLICATION, WITHOUT LONG-TERM CURRENT USE OF INSULIN (H): ICD-10-CM

## 2020-12-21 RX ORDER — ROSUVASTATIN CALCIUM 5 MG/1
TABLET, COATED ORAL
Qty: 90 TABLET | Refills: 0 | Status: SHIPPED | OUTPATIENT
Start: 2020-12-21 | End: 2021-01-07

## 2020-12-22 NOTE — TELEPHONE ENCOUNTER
Routing refill request to provider for review/approval because:  Labs not current:  A1C, creat

## 2021-01-07 ENCOUNTER — OFFICE VISIT (OUTPATIENT)
Dept: FAMILY MEDICINE | Facility: CLINIC | Age: 61
End: 2021-01-07
Payer: COMMERCIAL

## 2021-01-07 VITALS
DIASTOLIC BLOOD PRESSURE: 80 MMHG | HEART RATE: 74 BPM | HEIGHT: 74 IN | OXYGEN SATURATION: 97 % | SYSTOLIC BLOOD PRESSURE: 119 MMHG | WEIGHT: 218 LBS | BODY MASS INDEX: 27.98 KG/M2

## 2021-01-07 DIAGNOSIS — E11.9 TYPE 2 DIABETES MELLITUS WITHOUT COMPLICATION, WITHOUT LONG-TERM CURRENT USE OF INSULIN (H): ICD-10-CM

## 2021-01-07 DIAGNOSIS — E78.5 HYPERLIPIDEMIA LDL GOAL <100: Primary | ICD-10-CM

## 2021-01-07 DIAGNOSIS — Z12.5 PROSTATE CANCER SCREENING: ICD-10-CM

## 2021-01-07 LAB
ERYTHROCYTE [DISTWIDTH] IN BLOOD BY AUTOMATED COUNT: 12.6 % (ref 10–15)
HBA1C MFR BLD: 7.7 % (ref 0–5.6)
HCT VFR BLD AUTO: 42.4 % (ref 40–53)
HGB BLD-MCNC: 15 G/DL (ref 13.3–17.7)
MCH RBC QN AUTO: 32.2 PG (ref 26.5–33)
MCHC RBC AUTO-ENTMCNC: 35.4 G/DL (ref 31.5–36.5)
MCV RBC AUTO: 91 FL (ref 78–100)
PLATELET # BLD AUTO: 180 10E9/L (ref 150–450)
RBC # BLD AUTO: 4.66 10E12/L (ref 4.4–5.9)
WBC # BLD AUTO: 5 10E9/L (ref 4–11)

## 2021-01-07 PROCEDURE — 80061 LIPID PANEL: CPT | Performed by: INTERNAL MEDICINE

## 2021-01-07 PROCEDURE — 85027 COMPLETE CBC AUTOMATED: CPT | Performed by: INTERNAL MEDICINE

## 2021-01-07 PROCEDURE — 36415 COLL VENOUS BLD VENIPUNCTURE: CPT | Performed by: INTERNAL MEDICINE

## 2021-01-07 PROCEDURE — 99207 PR FOOT EXAM NO CHARGE: CPT | Mod: 25 | Performed by: INTERNAL MEDICINE

## 2021-01-07 PROCEDURE — 80053 COMPREHEN METABOLIC PANEL: CPT | Performed by: INTERNAL MEDICINE

## 2021-01-07 PROCEDURE — 83036 HEMOGLOBIN GLYCOSYLATED A1C: CPT | Performed by: INTERNAL MEDICINE

## 2021-01-07 PROCEDURE — 82043 UR ALBUMIN QUANTITATIVE: CPT | Performed by: INTERNAL MEDICINE

## 2021-01-07 PROCEDURE — 99214 OFFICE O/P EST MOD 30 MIN: CPT | Performed by: INTERNAL MEDICINE

## 2021-01-07 PROCEDURE — G0103 PSA SCREENING: HCPCS | Performed by: INTERNAL MEDICINE

## 2021-01-07 RX ORDER — ROSUVASTATIN CALCIUM 5 MG/1
5 TABLET, COATED ORAL DAILY
Qty: 90 TABLET | Refills: 3 | Status: SHIPPED | OUTPATIENT
Start: 2021-01-07 | End: 2021-05-03

## 2021-01-07 ASSESSMENT — MIFFLIN-ST. JEOR: SCORE: 1878.59

## 2021-01-07 NOTE — PROGRESS NOTES
"  Assessment & Plan     Type 2 diabetes mellitus without complication, without long-term current use of insulin (H)  Due for recheck of labs  - Hemoglobin A1c  - MO FOOT EXAM NO CHARGE  - Albumin Random Urine Quantitative with Creat Ratio  - metFORMIN (GLUCOPHAGE) 500 MG tablet; TAKE 1 TABLET BY MOUTH TWICE A DAY WITH MEALS    Hyperlipidemia LDL goal <100  On statin therapy, recheck labs  - Lipid panel reflex to direct LDL Fasting  - Comprehensive metabolic panel  - CBC with platelets  - rosuvastatin (CRESTOR) 5 MG tablet; Take 1 tablet (5 mg) by mouth daily    Prostate cancer screening    - Prostate spec antigen screen        BMI:   Estimated body mass index is 27.99 kg/m  as calculated from the following:    Height as of this encounter: 1.88 m (6' 2\").    Weight as of this encounter: 98.9 kg (218 lb).   Weight management plan: Discussed healthy diet and exercise guidelines          Return in about 6 months (around 7/7/2021) for Preventive Visit.    Narciso Cisneros MD  [facility] DOUGLAS Gutierrez is a 60 year old who presents to clinic today for the following health issues     HPI       Follow up chronic health cares/diabetes. He is not currently testing his BS. States he will start up now that the new year is here.       Here to follow-up diabetes, hyperlipidemia.  He feels well and is without any specific complaints today.    Review of Systems   Constitutional, HEENT, cardiovascular, pulmonary, gi and gu systems are negative, except as otherwise noted.      Objective    /80 (BP Location: Left arm, Cuff Size: Adult Large)   Pulse 74   Ht 1.88 m (6' 2\")   Wt 98.9 kg (218 lb)   SpO2 97%   BMI 27.99 kg/m    Body mass index is 27.99 kg/m .  Physical Exam   GENERAL: healthy, alert and no distress  NECK: no adenopathy, no asymmetry, masses, or scars and thyroid normal to palpation  RESP: lungs clear to auscultation - no rales, rhonchi or wheezes  CV: regular rate and rhythm, normal " S1 S2, no S3 or S4, no murmur, click or rub, no peripheral edema and peripheral pulses strong  ABDOMEN: soft, nontender, no hepatosplenomegaly, no masses and bowel sounds normal  MS: no gross musculoskeletal defects noted, no edema  NEURO: Normal strength and tone, mentation intact and speech normal  PSYCH: mentation appears normal, affect normal/bright  Diabetic foot exam: normal DP and PT pulses, no trophic changes or ulcerative lesions and normal sensory exam    Labs pending

## 2021-01-07 NOTE — LETTER
January 11, 2021      Joey Santillan  [address] [address]        Dear ,    The following letter pertains to your most recent diagnostic tests:     -Your microalbumin level which is a diabetes urine test to check for any evidence of early kidney injury from diabetes returned negative.     -Your prostate specific antigen (PSA) test result returned normal.     -Your cholesterol panel looks healthy.     -Liver and gallbladder tests are normal for you. (ALT,AST, Alk phos, bilirubin), kidney function is normal for you (Creatinine, GFR), Sodium is normal, Potassium is normal for you, Calcium is normal for you.     -Your hemoglobin A1c test which is a diabetes blood test that represents and average of your blood sugars over the last 3 months returned at 7.7 which is above your goal of hemoglobin A1c less than 7.       -Your complete blood counts including your hemoglobin returned normal for you.         Bottom line:  Results look except for the A1c which has crept up.  I think you could improve this without escalating drug therapy by trying to be as active as possible and watching the starches and sugars in the diet.         Follow up:  I would recommend a hemoglobin A1c recheck in 3 months.  You can schedule a lab appointment for that purpose.             Sincerely,     Dr. Cisneros    Resulted Orders   Lipid panel reflex to direct LDL Fasting   Result Value Ref Range    Cholesterol 135 <200 mg/dL    Triglycerides 101 <150 mg/dL    HDL Cholesterol 46 >39 mg/dL    LDL Cholesterol Calculated 69 <100 mg/dL      Comment:      Desirable:       <100 mg/dl    Non HDL Cholesterol 89 <130 mg/dL   Comprehensive metabolic panel   Result Value Ref Range    Sodium 138 133 - 144 mmol/L    Potassium 4.0 3.4 - 5.3 mmol/L    Chloride 107 94 - 109 mmol/L    Carbon Dioxide 26 20 - 32 mmol/L    Anion Gap 5 3 - 14 mmol/L    Glucose 124 (H) 70 - 99 mg/dL    Urea Nitrogen 25 7 - 30 mg/dL    Creatinine 0.83  0.66 - 1.25 mg/dL    GFR Estimate >90 >60 mL/min/[1.73_m2]      Comment:      Non  GFR Calc  Starting 12/18/2018, serum creatinine based estimated GFR (eGFR) will be   calculated using the Chronic Kidney Disease Epidemiology Collaboration   (CKD-EPI) equation.      GFR Estimate If Black >90 >60 mL/min/[1.73_m2]      Comment:       GFR Calc  Starting 12/18/2018, serum creatinine based estimated GFR (eGFR) will be   calculated using the Chronic Kidney Disease Epidemiology Collaboration   (CKD-EPI) equation.      Calcium 9.1 8.5 - 10.1 mg/dL    Bilirubin Total 0.4 0.2 - 1.3 mg/dL    Albumin 4.1 3.4 - 5.0 g/dL    Protein Total 7.5 6.8 - 8.8 g/dL    Alkaline Phosphatase 61 40 - 150 U/L    ALT 36 0 - 70 U/L    AST 18 0 - 45 U/L   CBC with platelets   Result Value Ref Range    WBC 5.0 4.0 - 11.0 10e9/L    RBC Count 4.66 4.4 - 5.9 10e12/L    Hemoglobin 15.0 13.3 - 17.7 g/dL    Hematocrit 42.4 40.0 - 53.0 %    MCV 91 78 - 100 fl    MCH 32.2 26.5 - 33.0 pg    MCHC 35.4 31.5 - 36.5 g/dL    RDW 12.6 10.0 - 15.0 %    Platelet Count 180 150 - 450 10e9/L   Hemoglobin A1c   Result Value Ref Range    Hemoglobin A1C 7.7 (H) 0 - 5.6 %      Comment:      Normal <5.7% Prediabetes 5.7-6.4%  Diabetes 6.5% or higher - adopted from ADA   consensus guidelines.     Albumin Random Urine Quantitative with Creat Ratio   Result Value Ref Range    Creatinine Urine 221 mg/dL    Albumin Urine mg/L 15 mg/L    Albumin Urine mg/g Cr 6.83 0 - 17 mg/g Cr   Prostate spec antigen screen   Result Value Ref Range    PSA 0.97 0 - 4 ug/L      Comment:      Assay Method:  Chemiluminescence using Siemens Vista analyzer

## 2021-01-08 LAB
ALBUMIN SERPL-MCNC: 4.1 G/DL (ref 3.4–5)
ALP SERPL-CCNC: 61 U/L (ref 40–150)
ALT SERPL W P-5'-P-CCNC: 36 U/L (ref 0–70)
ANION GAP SERPL CALCULATED.3IONS-SCNC: 5 MMOL/L (ref 3–14)
AST SERPL W P-5'-P-CCNC: 18 U/L (ref 0–45)
BILIRUB SERPL-MCNC: 0.4 MG/DL (ref 0.2–1.3)
BUN SERPL-MCNC: 25 MG/DL (ref 7–30)
CALCIUM SERPL-MCNC: 9.1 MG/DL (ref 8.5–10.1)
CHLORIDE SERPL-SCNC: 107 MMOL/L (ref 94–109)
CHOLEST SERPL-MCNC: 135 MG/DL
CO2 SERPL-SCNC: 26 MMOL/L (ref 20–32)
CREAT SERPL-MCNC: 0.83 MG/DL (ref 0.66–1.25)
CREAT UR-MCNC: 221 MG/DL
GFR SERPL CREATININE-BSD FRML MDRD: >90 ML/MIN/{1.73_M2}
GLUCOSE SERPL-MCNC: 124 MG/DL (ref 70–99)
HDLC SERPL-MCNC: 46 MG/DL
LDLC SERPL CALC-MCNC: 69 MG/DL
MICROALBUMIN UR-MCNC: 15 MG/L
MICROALBUMIN/CREAT UR: 6.83 MG/G CR (ref 0–17)
NONHDLC SERPL-MCNC: 89 MG/DL
POTASSIUM SERPL-SCNC: 4 MMOL/L (ref 3.4–5.3)
PROT SERPL-MCNC: 7.5 G/DL (ref 6.8–8.8)
PSA SERPL-ACNC: 0.97 UG/L (ref 0–4)
SODIUM SERPL-SCNC: 138 MMOL/L (ref 133–144)
TRIGL SERPL-MCNC: 101 MG/DL

## 2021-01-09 NOTE — RESULT ENCOUNTER NOTE
The following letter pertains to your most recent diagnostic tests:    -Your microalbumin level which is a diabetes urine test to check for any evidence of early kidney injury from diabetes returned negative.    -Your prostate specific antigen (PSA) test result returned normal.     -Your cholesterol panel looks healthy.     -Liver and gallbladder tests are normal for you. (ALT,AST, Alk phos, bilirubin), kidney function is normal for you (Creatinine, GFR), Sodium is normal, Potassium is normal for you, Calcium is normal for you.    -Your hemoglobin A1c test which is a diabetes blood test that represents and average of your blood sugars over the last 3 months returned at 7.7 which is above your goal of hemoglobin A1c less than 7.      -Your complete blood counts including your hemoglobin returned normal for you.         Bottom line:  Results look except for the A1c which has crept up.  I think you could improve this without escalating drug therapy by trying to be as active as possible and watching the starches and sugars in the diet.         Follow up:  I would recommend a hemoglobin A1c recheck in 3 months.  You can schedule a lab appointment for that purpose.          Sincerely,    Dr. Cisneros

## 2021-01-15 ENCOUNTER — HEALTH MAINTENANCE LETTER (OUTPATIENT)
Age: 61
End: 2021-01-15

## 2021-04-06 ENCOUNTER — MYC MEDICAL ADVICE (OUTPATIENT)
Dept: FAMILY MEDICINE | Facility: CLINIC | Age: 61
End: 2021-04-06

## 2021-04-06 ENCOUNTER — TELEPHONE (OUTPATIENT)
Dept: FAMILY MEDICINE | Facility: CLINIC | Age: 61
End: 2021-04-06

## 2021-04-06 NOTE — TELEPHONE ENCOUNTER
Patient Quality Outreach      Summary:    Patient has the following on his problem list/HM:   Diabetes    Last A1C:  Lab Results   Component Value Date    A1C 7.7 01/07/2021    A1C 6.9 02/21/2020       Last LDL:    Lab Results   Component Value Date    LDL 69 01/07/2021       Is the patient on a Statin? Yes          Is the patient on Aspirin? Yes    Medications     HMG CoA Reductase Inhibitors     rosuvastatin (CRESTOR) 5 MG tablet       Salicylates     aspirin (ASPIRIN ADULT LOW STRENGTH) 81 MG PO EC tablet             Last three blood pressure readings:  BP Readings from Last 3 Encounters:   01/07/21 119/80   02/21/20 108/73   10/18/19 126/87            Tobacco Use      Smoking status: Former Smoker        Packs/day: 0.00        Years: 5.00        Pack years: 0        Types: Cigars      Smokeless tobacco: Never Used          Patient is due/failing the following:   Eye Exam    Type of outreach:    Sent [employer] message.    Questions for provider review:    None                                                                                                                                     Melanie Ann CMA

## 2021-05-01 ENCOUNTER — MYC MEDICAL ADVICE (OUTPATIENT)
Dept: FAMILY MEDICINE | Facility: CLINIC | Age: 61
End: 2021-05-01

## 2021-05-01 DIAGNOSIS — E78.5 HYPERLIPIDEMIA LDL GOAL <100: ICD-10-CM

## 2021-05-03 RX ORDER — ROSUVASTATIN CALCIUM 5 MG/1
5 TABLET, COATED ORAL DAILY
Qty: 90 TABLET | Refills: 2 | Status: SHIPPED | OUTPATIENT
Start: 2021-05-03 | End: 2022-03-14

## 2021-06-03 ENCOUNTER — TRANSFERRED RECORDS (OUTPATIENT)
Dept: MULTI SPECIALTY CLINIC | Facility: CLINIC | Age: 61
End: 2021-06-03

## 2021-06-03 LAB
RETINOPATHY: NEGATIVE
RETINOPATHY: NORMAL

## 2021-06-07 NOTE — TELEPHONE ENCOUNTER
Last eye Exam June 3rd 2021 [facility].  Melanie Ann CMA

## 2021-06-14 NOTE — TELEPHONE ENCOUNTER
I called patient home phone and spoke with wife.   Per Lab result notes, patient does not want to start metformin and would like to discuss with Dr. Cisneros first.   Patient able to come in Oct 23 or 25  Scheduled patient for 10/23/17 11:00am Dr. Cisneros.  This was scheduled with the patient's wife.     Jyoti Hercules RN     Additional Progress Note...

## 2021-06-25 ASSESSMENT — ENCOUNTER SYMPTOMS
HEMATURIA: 0
CONSTIPATION: 0
HEMATOCHEZIA: 0
DIARRHEA: 0
HEADACHES: 0
FEVER: 0
JOINT SWELLING: 0
MYALGIAS: 0
NERVOUS/ANXIOUS: 0
NAUSEA: 0
ABDOMINAL PAIN: 0
COUGH: 0
SHORTNESS OF BREATH: 0
ARTHRALGIAS: 0
DYSURIA: 0
PALPITATIONS: 0
DIZZINESS: 0
HEARTBURN: 0
WEAKNESS: 0
PARESTHESIAS: 0
FREQUENCY: 0
CHILLS: 0
SORE THROAT: 0
EYE PAIN: 0

## 2021-06-28 ENCOUNTER — OFFICE VISIT (OUTPATIENT)
Dept: FAMILY MEDICINE | Facility: CLINIC | Age: 61
End: 2021-06-28
Payer: COMMERCIAL

## 2021-06-28 VITALS
BODY MASS INDEX: 26.32 KG/M2 | HEART RATE: 75 BPM | HEIGHT: 74 IN | OXYGEN SATURATION: 97 % | SYSTOLIC BLOOD PRESSURE: 108 MMHG | WEIGHT: 205.1 LBS | TEMPERATURE: 96.8 F | DIASTOLIC BLOOD PRESSURE: 77 MMHG

## 2021-06-28 DIAGNOSIS — Z00.00 ROUTINE GENERAL MEDICAL EXAMINATION AT A HEALTH CARE FACILITY: Primary | ICD-10-CM

## 2021-06-28 DIAGNOSIS — E78.5 HYPERLIPIDEMIA LDL GOAL <100: ICD-10-CM

## 2021-06-28 DIAGNOSIS — E11.9 TYPE 2 DIABETES MELLITUS WITHOUT COMPLICATION, WITHOUT LONG-TERM CURRENT USE OF INSULIN (H): ICD-10-CM

## 2021-06-28 LAB — HBA1C MFR BLD: 7 % (ref 0–5.6)

## 2021-06-28 PROCEDURE — 99396 PREV VISIT EST AGE 40-64: CPT | Performed by: INTERNAL MEDICINE

## 2021-06-28 PROCEDURE — 36415 COLL VENOUS BLD VENIPUNCTURE: CPT | Performed by: INTERNAL MEDICINE

## 2021-06-28 PROCEDURE — 83036 HEMOGLOBIN GLYCOSYLATED A1C: CPT | Performed by: INTERNAL MEDICINE

## 2021-06-28 SDOH — HEALTH STABILITY: MENTAL HEALTH: HOW OFTEN DO YOU HAVE A DRINK CONTAINING ALCOHOL?: NOT ASKED

## 2021-06-28 SDOH — HEALTH STABILITY: MENTAL HEALTH: HOW MANY STANDARD DRINKS CONTAINING ALCOHOL DO YOU HAVE ON A TYPICAL DAY?: NOT ASKED

## 2021-06-28 SDOH — HEALTH STABILITY: MENTAL HEALTH: HOW OFTEN DO YOU HAVE 6 OR MORE DRINKS ON ONE OCCASION?: NOT ASKED

## 2021-06-28 ASSESSMENT — MIFFLIN-ST. JEOR: SCORE: 1812.14

## 2021-06-28 NOTE — PROGRESS NOTES
SUBJECTIVE:   CC: Joey Santillan is an 60 year old male who presents for preventative health visit.       Patient has been advised of split billing requirements and indicates understanding: Yes  Healthy Habits:     Getting at least 3 servings of Calcium per day:  Yes    Bi-annual eye exam:  Yes    Dental care twice a year:  Yes    Sleep apnea or symptoms of sleep apnea:  None    Diet:  Diabetic    Frequency of exercise:  6-7 days/week    Duration of exercise:  30-45 minutes    PHQ-2 Total Score: 0    Additional concerns today:  No    Follow up diabetes and hyperlipidemia     Today's PHQ-2 Score:   PHQ-2 ( 1999 [employer]) 6/28/2021   Q1: Little interest or pleasure in doing things 0   Q2: Feeling down, depressed or hopeless 0   PHQ-2 Score 0   Q1: Little interest or pleasure in doing things -   Q2: Feeling down, depressed or hopeless -   PHQ-2 Score -       Abuse: Current or Past(Physical, Sexual or Emotional)- No  Do you feel safe in your environment? Yes    Have you ever done Advance Care Planning? (For example, a Health Directive, POLST, or a discussion with a medical provider or your loved ones about your wishes): Yes, patient states has an Advance Care Planning document and will bring a copy to the clinic.    Social History     Tobacco Use     Smoking status: Current Some Day Smoker     Packs/day: 0.00     Years: 5.00     Pack years: 0.00     Types: Cigars     Smokeless tobacco: Never Used   Substance Use Topics     Alcohol use: Yes     Alcohol/week: 0.0 - 4.0 standard drinks     Comment: 1-2 drinks per week     If you drink alcohol do you typically have >3 drinks per day or >7 drinks per week? No    Alcohol Use 6/28/2021   Prescreen: >3 drinks/day or >7 drinks/week? -   Prescreen: >3 drinks/day or >7 drinks/week? No       Last PSA:   PSA   Date Value Ref Range Status   01/07/2021 0.97 0 - 4 ug/L Final     Comment:     Assay Method:  Chemiluminescence using Siemens Vista analyzer       Reviewed orders with  patient. Reviewed health maintenance and updated orders accordingly - Yes  Patient Active Problem List   Diagnosis     Overweight     Type 2 diabetes mellitus without complication, without long-term current use of insulin (H)     Hyperlipidemia LDL goal <100     Past Surgical History:   Procedure Laterality Date     COLONOSCOPY N/A 9/10/2018    Procedure: COLONOSCOPY;  colonoscopy;  Surgeon: Mallory Rudd MD;  Location:  GI       Social History     Tobacco Use     Smoking status: Current Some Day Smoker     Packs/day: 0.00     Years: 5.00     Pack years: 0.00     Types: Cigars     Smokeless tobacco: Never Used   Substance Use Topics     Alcohol use: Yes     Alcohol/week: 0.0 - 4.0 standard drinks     Comment: 1-2 drinks per week     Family History   Problem Relation Age of Onset     C.A.D. Father      Diabetes Mother      Alzheimer Disease Paternal Grandmother      Arthritis Maternal Grandmother      Diabetes Other      Cancer - colorectal No family hx of      Prostate Cancer No family hx of          Current Outpatient Medications   Medication Sig Dispense Refill     aspirin (ASPIRIN ADULT LOW STRENGTH) 81 MG PO EC tablet Take 1 tablet (81 mg) by mouth daily 90 tablet 1     metFORMIN (GLUCOPHAGE) 500 MG tablet TAKE 1 TABLET BY MOUTH TWICE A DAY WITH MEALS 180 tablet 3     rosuvastatin (CRESTOR) 5 MG tablet Take 1 tablet (5 mg) by mouth daily 90 tablet 2     ACE/ARB/ARNI NOT PRESCRIBED, INTENTIONAL, Please choose reason not prescribed, below (Patient not taking: Reported on 6/28/2021)       No Known Allergies    Reviewed and updated as needed this visit by clinical staff  Tobacco  Allergies  Meds              Reviewed and updated as needed this visit by Provider                Past Medical History:   Diagnosis Date     Hyperlipidemia LDL goal <100 10/23/2017     Pilonidal cyst      Type 2 diabetes mellitus without complication, without long-term current use of insulin (H) 10/23/2017      Past Surgical  "History:   Procedure Laterality Date     COLONOSCOPY N/A 9/10/2018    Procedure: COLONOSCOPY;  colonoscopy;  Surgeon: Mallory Rudd MD;  Location:  GI       Review of Systems  A 10 organ systems ROS is negative other than any pertinent positives or negatives previously stated.     OBJECTIVE:   /77 (BP Location: Left arm, Cuff Size: Adult Large)   Pulse 75   Temp 96.8  F (36  C) (Tympanic)   Ht 1.867 m (6' 1.5\")   Wt 93 kg (205 lb 1.6 oz)   SpO2 97%   BMI 26.69 kg/m      Physical Exam  GENERAL: healthy, alert and no distress  EYES: Eyes grossly normal to inspection, PERRL and conjunctivae and sclerae normal  HENT: ear canals and TM's normal, nose and mouth without ulcers or lesions  NECK: no adenopathy, no asymmetry, masses, or scars and thyroid normal to palpation  RESP: lungs clear to auscultation - no rales, rhonchi or wheezes  CV: regular rate and rhythm, normal S1 S2, no S3 or S4, no murmur, click or rub, no peripheral edema and peripheral pulses strong  ABDOMEN: soft, nontender, no hepatosplenomegaly, no masses and bowel sounds normal  RECTAL: normal sphincter tone, no rectal masses, prostate normal size, smooth, nontender without nodules or masses  MS: no gross musculoskeletal defects noted, no edema  SKIN: no suspicious lesions or rashes  NEURO: Normal strength and tone, mentation intact and speech normal  PSYCH: mentation appears normal, affect normal/bright    A1c 7.0    ASSESSMENT/PLAN:   1. Routine general medical examination at a health care facility      2. Type 2 diabetes mellitus without complication, without long-term current use of insulin (H)  This is much better.  Congratulations!  No need to add more medications, recheck in 6 months.  Keep up good work  - HEMOGLOBIN A1C  - metFORMIN (GLUCOPHAGE) 500 MG tablet; TAKE 1 TABLET BY MOUTH TWICE A DAY WITH MEALS  Dispense: 180 tablet; Refill: 3    3. Hyperlipidemia LDL goal <100  Well controlled last winter; recheck in January " "      Patient has been advised of split billing requirements and indicates understanding: Yes  COUNSELING:   Reviewed preventive health counseling, as reflected in patient instructions  Special attention given to:        Regular exercise       Healthy diet/nutrition       Immunizations    Recommended second covid shot; discussed shingrix as well              Consider Hep C screening for all patients one time for ages 18-79 years       HIV screeninx in teen years, 1x in adult years, and at intervals if high risk       Colon cancer screening       Prostate cancer screening       Consider lung cancer screening for ages 55-80 years and 30 pack-year smoking history  ; less than 30 pack year hx     Estimated body mass index is 26.69 kg/m  as calculated from the following:    Height as of this encounter: 1.867 m (6' 1.5\").    Weight as of this encounter: 93 kg (205 lb 1.6 oz).     Weight management plan: Discussed healthy diet and exercise guidelines    He reports that he has been smoking cigars. He has been smoking about 0.00 packs per day for the past 5.00 years. He has never used smokeless tobacco.  Tobacco Cessation Action Plan:   Self help information given to patient      Counseling Resources:  ATP IV Guidelines  Pooled Cohorts Equation Calculator  FRAX Risk Assessment  ICSI Preventive Guidelines  Dietary Guidelines for Americans,   USDA's MyPlate  ASA Prophylaxis  Lung CA Screening    Narciso Cisneros MD  [facility]  "

## 2021-06-28 NOTE — RESULT ENCOUNTER NOTE
The following letter pertains to your most recent diagnostic tests:    -Your hemoglobin A1c test which is a diabetes blood test that represents and average of your blood sugars over the last 3 months returned at 7 which is much improved.         Sincerely,    Dr. Cisneros

## 2021-09-04 ENCOUNTER — HEALTH MAINTENANCE LETTER (OUTPATIENT)
Age: 61
End: 2021-09-04

## 2021-09-15 NOTE — TELEPHONE ENCOUNTER
[facility] Social Needs Outreach  [facility]/Voicemail       Clinical Data: [facility] Outreach    Outreach attempted x 1.      Left message on patient's voicemail with call back information and requested return call.    [facility] Plan will try to reach patient again in 1-2 business days.     ECHO (10/7/2020): EF 20-25%, TRACE MR

## 2022-02-19 ENCOUNTER — HEALTH MAINTENANCE LETTER (OUTPATIENT)
Age: 62
End: 2022-02-19

## 2022-03-13 ENCOUNTER — MYC MEDICAL ADVICE (OUTPATIENT)
Dept: FAMILY MEDICINE | Facility: CLINIC | Age: 62
End: 2022-03-13
Payer: COMMERCIAL

## 2022-03-13 DIAGNOSIS — E78.5 HYPERLIPIDEMIA LDL GOAL <100: ICD-10-CM

## 2022-03-14 RX ORDER — ROSUVASTATIN CALCIUM 5 MG/1
5 TABLET, COATED ORAL DAILY
Qty: 90 TABLET | Refills: 1 | Status: SHIPPED | OUTPATIENT
Start: 2022-03-14 | End: 2022-10-17

## 2022-03-14 NOTE — TELEPHONE ENCOUNTER
LDL Cholesterol Calculated   Date Value Ref Range Status   01/07/2021 69 <100 mg/dL Final     Comment:     Desirable:       <100 mg/dl     Last OV 6/28/21 - annual exam     Routing refill request to provider for review/approval because:  Labs not current:  LDL     Kathi ABRAHAM, Triage RN  [facility]

## 2022-07-05 ENCOUNTER — DOCUMENTATION ONLY (OUTPATIENT)
Dept: LAB | Facility: CLINIC | Age: 62
End: 2022-07-05

## 2022-07-05 DIAGNOSIS — E11.9 TYPE 2 DIABETES MELLITUS WITHOUT COMPLICATION, WITHOUT LONG-TERM CURRENT USE OF INSULIN (H): Primary | ICD-10-CM

## 2022-07-05 DIAGNOSIS — Z00.00 ROUTINE GENERAL MEDICAL EXAMINATION AT A HEALTH CARE FACILITY: ICD-10-CM

## 2022-07-05 NOTE — PROGRESS NOTES
Joey Santillan has an upcoming lab appointment:    Future Appointments   Date Time Provider Department Center   7/8/2022  9:00 AM CS LAB [facility]     Patient is scheduled for the following lab(s):     There is no order available. Please review and place either future orders or HMPO (Review of Health Maintenance Protocol Orders), as appropriate.    Health Maintenance Due   Topic     ANNUAL REVIEW OF HM ORDERS      A1C      BMP      LIPID      MICROALBUMIN      Aby Holt

## 2022-07-08 ENCOUNTER — LAB (OUTPATIENT)
Dept: LAB | Facility: CLINIC | Age: 62
End: 2022-07-08
Payer: COMMERCIAL

## 2022-07-08 DIAGNOSIS — E11.9 TYPE 2 DIABETES MELLITUS WITHOUT COMPLICATION, WITHOUT LONG-TERM CURRENT USE OF INSULIN (H): ICD-10-CM

## 2022-07-08 DIAGNOSIS — Z00.00 ROUTINE GENERAL MEDICAL EXAMINATION AT A HEALTH CARE FACILITY: ICD-10-CM

## 2022-07-08 LAB
ALBUMIN SERPL-MCNC: 4.1 G/DL (ref 3.4–5)
ALP SERPL-CCNC: 58 U/L (ref 40–150)
ALT SERPL W P-5'-P-CCNC: 31 U/L (ref 0–70)
ANION GAP SERPL CALCULATED.3IONS-SCNC: 9 MMOL/L (ref 3–14)
AST SERPL W P-5'-P-CCNC: 17 U/L (ref 0–45)
BILIRUB SERPL-MCNC: 0.8 MG/DL (ref 0.2–1.3)
BUN SERPL-MCNC: 20 MG/DL (ref 7–30)
CALCIUM SERPL-MCNC: 9.1 MG/DL (ref 8.5–10.1)
CHLORIDE BLD-SCNC: 104 MMOL/L (ref 94–109)
CHOLEST SERPL-MCNC: 152 MG/DL
CO2 SERPL-SCNC: 24 MMOL/L (ref 20–32)
CREAT SERPL-MCNC: 0.78 MG/DL (ref 0.66–1.25)
ERYTHROCYTE [DISTWIDTH] IN BLOOD BY AUTOMATED COUNT: 12.1 % (ref 10–15)
FASTING STATUS PATIENT QL REPORTED: YES
GFR SERPL CREATININE-BSD FRML MDRD: >90 ML/MIN/1.73M2
GLUCOSE BLD-MCNC: 224 MG/DL (ref 70–99)
HBA1C MFR BLD: 8.2 % (ref 0–5.6)
HCT VFR BLD AUTO: 44.2 % (ref 40–53)
HDLC SERPL-MCNC: 47 MG/DL
HGB BLD-MCNC: 15.5 G/DL (ref 13.3–17.7)
LDLC SERPL CALC-MCNC: 84 MG/DL
MCH RBC QN AUTO: 31.6 PG (ref 26.5–33)
MCHC RBC AUTO-ENTMCNC: 35.1 G/DL (ref 31.5–36.5)
MCV RBC AUTO: 90 FL (ref 78–100)
NONHDLC SERPL-MCNC: 105 MG/DL
PLATELET # BLD AUTO: 168 10E3/UL (ref 150–450)
POTASSIUM BLD-SCNC: 4.1 MMOL/L (ref 3.4–5.3)
PROT SERPL-MCNC: 7.3 G/DL (ref 6.8–8.8)
PSA SERPL-MCNC: 1.4 UG/L (ref 0–4)
RBC # BLD AUTO: 4.91 10E6/UL (ref 4.4–5.9)
SODIUM SERPL-SCNC: 137 MMOL/L (ref 133–144)
TRIGL SERPL-MCNC: 106 MG/DL
WBC # BLD AUTO: 3.8 10E3/UL (ref 4–11)

## 2022-07-08 PROCEDURE — G0103 PSA SCREENING: HCPCS

## 2022-07-08 PROCEDURE — 85027 COMPLETE CBC AUTOMATED: CPT

## 2022-07-08 PROCEDURE — 82043 UR ALBUMIN QUANTITATIVE: CPT

## 2022-07-08 PROCEDURE — 80061 LIPID PANEL: CPT

## 2022-07-08 PROCEDURE — 83036 HEMOGLOBIN GLYCOSYLATED A1C: CPT

## 2022-07-08 PROCEDURE — 80053 COMPREHEN METABOLIC PANEL: CPT

## 2022-07-08 PROCEDURE — 36415 COLL VENOUS BLD VENIPUNCTURE: CPT

## 2022-07-08 NOTE — LETTER
July 11, 2022      Matt Santillan  [address] [address]        Dear ,    We are writing to inform you of your test results.    The following letter pertains to your most recent diagnostic tests:     The lab results look OK except for the blood sugar is not well controlled.  We should meet to discuss how to improve this.  I will have my staff reach out to you to schedule an appointment.         Resulted Orders   Lipid panel reflex to direct LDL Fasting   Result Value Ref Range    Cholesterol 152 <200 mg/dL    Triglycerides 106 <150 mg/dL    Direct Measure HDL 47 >=40 mg/dL    LDL Cholesterol Calculated 84 <=100 mg/dL    Non HDL Cholesterol 105 <130 mg/dL    Patient Fasting > 8hrs? Yes     Narrative    Cholesterol  Desirable:  <200 mg/dL    Triglycerides  Normal:  Less than 150 mg/dL  Borderline High:  150-199 mg/dL  High:  200-499 mg/dL  Very High:  Greater than or equal to 500 mg/dL    Direct Measure HDL  Female:  Greater than or equal to 50 mg/dL   Male:  Greater than or equal to 40 mg/dL    LDL Cholesterol  Desirable:  <100mg/dL  Above Desirable:  100-129 mg/dL   Borderline High:  130-159 mg/dL   High:  160-189 mg/dL   Very High:  >= 190 mg/dL    Non HDL Cholesterol  Desirable:  130 mg/dL  Above Desirable:  130-159 mg/dL  Borderline High:  160-189 mg/dL  High:  190-219 mg/dL  Very High:  Greater than or equal to 220 mg/dL   Comprehensive metabolic panel   Result Value Ref Range    Sodium 137 133 - 144 mmol/L    Potassium 4.1 3.4 - 5.3 mmol/L    Chloride 104 94 - 109 mmol/L    Carbon Dioxide (CO2) 24 20 - 32 mmol/L    Anion Gap 9 3 - 14 mmol/L    Urea Nitrogen 20 7 - 30 mg/dL    Creatinine 0.78 0.66 - 1.25 mg/dL    Calcium 9.1 8.5 - 10.1 mg/dL    Glucose 224 (H) 70 - 99 mg/dL    Alkaline Phosphatase 58 40 - 150 U/L    AST 17 0 - 45 U/L    ALT 31 0 - 70 U/L    Protein Total 7.3 6.8 - 8.8 g/dL    Albumin 4.1 3.4 - 5.0 g/dL    Bilirubin Total 0.8 0.2 - 1.3 mg/dL    GFR Estimate >90  >60 mL/min/1.73m2      Comment:      Effective December 21, 2021 eGFRcr in adults is calculated using the 2021 CKD-EPI creatinine equation which includes age and gender (Deandre et al., NE, DOI: 10.1056/XIDUrk6349955)   CBC with platelets   Result Value Ref Range    WBC Count 3.8 (L) 4.0 - 11.0 10e3/uL    RBC Count 4.91 4.40 - 5.90 10e6/uL    Hemoglobin 15.5 13.3 - 17.7 g/dL    Hematocrit 44.2 40.0 - 53.0 %    MCV 90 78 - 100 fL    MCH 31.6 26.5 - 33.0 pg    MCHC 35.1 31.5 - 36.5 g/dL    RDW 12.1 10.0 - 15.0 %    Platelet Count 168 150 - 450 10e3/uL   PROSTATE SPEC ANTIGEN SCREEN   Result Value Ref Range    Prostate Specific Antigen Screen 1.40 0.00 - 4.00 ug/L   Albumin Random Urine Quantitative with Creat Ratio   Result Value Ref Range    Creatinine Urine mg/dL 263 mg/dL    Albumin Urine mg/L 14 mg/L    Albumin Urine mg/g Cr 5.32 0.00 - 17.00 mg/g Cr   HEMOGLOBIN A1C   Result Value Ref Range    Hemoglobin A1C 8.2 (H) 0.0 - 5.6 %      Comment:      Normal <5.7%   Prediabetes 5.7-6.4%    Diabetes 6.5% or higher     Note: Adopted from ADA consensus guidelines.       If you have any questions or concerns, please call the clinic at the number listed above.       Sincerely,      Narciso Cisneros MD

## 2022-07-09 LAB
CREAT UR-MCNC: 263 MG/DL
MICROALBUMIN UR-MCNC: 14 MG/L
MICROALBUMIN/CREAT UR: 5.32 MG/G CR (ref 0–17)

## 2022-07-10 ENCOUNTER — MYC MEDICAL ADVICE (OUTPATIENT)
Dept: FAMILY MEDICINE | Facility: CLINIC | Age: 62
End: 2022-07-10

## 2022-07-10 DIAGNOSIS — E11.9 TYPE 2 DIABETES MELLITUS WITHOUT COMPLICATION, WITHOUT LONG-TERM CURRENT USE OF INSULIN (H): Primary | ICD-10-CM

## 2022-07-10 NOTE — RESULT ENCOUNTER NOTE
The following letter pertains to your most recent diagnostic tests:    The lab results look OK except for the blood sugar is not well controlled.  We should meet to discuss how to improve this.  I will have my staff reach out to you to schedule an appointment.        Sincerely,    Dr. Cisneros

## 2022-07-10 NOTE — RESULT ENCOUNTER NOTE
Can you help him schedule an appointment (virtual OK) to discuss how to improve his diabetes control?

## 2022-07-11 ENCOUNTER — TELEPHONE (OUTPATIENT)
Dept: FAMILY MEDICINE | Facility: CLINIC | Age: 62
End: 2022-07-11

## 2022-07-11 NOTE — TELEPHONE ENCOUNTER
LVM for patient to call back.  Schedule virtual visit to discuss diabetes per below.    Sylvie Lin MA

## 2022-07-11 NOTE — TELEPHONE ENCOUNTER
----- Message from Narciso Cisneros MD sent at 7/10/2022  4:16 PM CDT -----  Can you help him schedule an appointment (virtual OK) to discuss how to improve his diabetes control?

## 2022-07-11 NOTE — TELEPHONE ENCOUNTER
See FYI from patient.     Charltote Calderon, RN  [facility] RN Triage Team

## 2022-08-06 ENCOUNTER — HEALTH MAINTENANCE LETTER (OUTPATIENT)
Age: 62
End: 2022-08-06

## 2022-08-06 DIAGNOSIS — E11.9 TYPE 2 DIABETES MELLITUS WITHOUT COMPLICATION, WITHOUT LONG-TERM CURRENT USE OF INSULIN (H): ICD-10-CM

## 2022-08-08 NOTE — TELEPHONE ENCOUNTER
Pt has future appt 10/17    Routing refill request to provider for review/approval because:     Biguanide Agents Failed    Protocol Details  Recent (6 mo) or future (30 days) visit within the authorizing provider's specialty

## 2022-08-11 ENCOUNTER — TRANSFERRED RECORDS (OUTPATIENT)
Dept: HEALTH INFORMATION MANAGEMENT | Facility: CLINIC | Age: 62
End: 2022-08-11

## 2022-08-11 LAB — RETINOPATHY: NEGATIVE

## 2022-09-26 ENCOUNTER — MYC MEDICAL ADVICE (OUTPATIENT)
Dept: FAMILY MEDICINE | Facility: CLINIC | Age: 62
End: 2022-09-26

## 2022-09-26 DIAGNOSIS — M72.2 PLANTAR FASCIITIS: Primary | ICD-10-CM

## 2022-09-27 NOTE — TELEPHONE ENCOUNTER
Patient wondering if he needs to be seen sooner for plantar fasciitis or if visit on 10/17 is fine. Please advise.    Valente Hurley CMA on 9/27/2022 at 1:19 PM

## 2022-10-17 ENCOUNTER — OFFICE VISIT (OUTPATIENT)
Dept: FAMILY MEDICINE | Facility: CLINIC | Age: 62
End: 2022-10-17
Payer: COMMERCIAL

## 2022-10-17 VITALS
HEART RATE: 59 BPM | HEIGHT: 74 IN | BODY MASS INDEX: 26.31 KG/M2 | SYSTOLIC BLOOD PRESSURE: 118 MMHG | TEMPERATURE: 96.5 F | OXYGEN SATURATION: 100 % | WEIGHT: 205 LBS | RESPIRATION RATE: 16 BRPM | DIASTOLIC BLOOD PRESSURE: 75 MMHG

## 2022-10-17 DIAGNOSIS — E78.5 HYPERLIPIDEMIA LDL GOAL <100: ICD-10-CM

## 2022-10-17 DIAGNOSIS — M72.2 PLANTAR FASCIITIS: ICD-10-CM

## 2022-10-17 DIAGNOSIS — E11.9 TYPE 2 DIABETES MELLITUS WITHOUT COMPLICATION, WITHOUT LONG-TERM CURRENT USE OF INSULIN (H): Primary | ICD-10-CM

## 2022-10-17 LAB — HBA1C MFR BLD: 7.3 % (ref 0–5.6)

## 2022-10-17 PROCEDURE — 99214 OFFICE O/P EST MOD 30 MIN: CPT | Performed by: INTERNAL MEDICINE

## 2022-10-17 PROCEDURE — 83036 HEMOGLOBIN GLYCOSYLATED A1C: CPT | Performed by: INTERNAL MEDICINE

## 2022-10-17 PROCEDURE — 36415 COLL VENOUS BLD VENIPUNCTURE: CPT | Performed by: INTERNAL MEDICINE

## 2022-10-17 PROCEDURE — 99207 PR FOOT EXAM NO CHARGE: CPT | Performed by: INTERNAL MEDICINE

## 2022-10-17 RX ORDER — ROSUVASTATIN CALCIUM 5 MG/1
5 TABLET, COATED ORAL DAILY
Qty: 90 TABLET | Refills: 3 | Status: SHIPPED | OUTPATIENT
Start: 2022-10-17 | End: 2023-01-16

## 2022-10-17 ASSESSMENT — PAIN SCALES - GENERAL: PAINLEVEL: MILD PAIN (3)
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
## 2022-10-21 ENCOUNTER — OFFICE VISIT (OUTPATIENT)
Dept: PODIATRY | Facility: CLINIC | Age: 62
End: 2022-10-21
Attending: INTERNAL MEDICINE
Payer: COMMERCIAL

## 2022-10-21 VITALS
BODY MASS INDEX: 26.31 KG/M2 | WEIGHT: 205 LBS | HEIGHT: 74 IN | DIASTOLIC BLOOD PRESSURE: 67 MMHG | SYSTOLIC BLOOD PRESSURE: 106 MMHG | HEART RATE: 71 BPM

## 2022-10-21 DIAGNOSIS — E11.65 UNCONTROLLED TYPE 2 DIABETES MELLITUS WITH HYPERGLYCEMIA (H): ICD-10-CM

## 2022-10-21 DIAGNOSIS — M79.671 PAIN OF RIGHT HEEL: ICD-10-CM

## 2022-10-21 DIAGNOSIS — M72.2 PLANTAR FASCIITIS: Primary | ICD-10-CM

## 2022-10-21 PROCEDURE — 99203 OFFICE O/P NEW LOW 30 MIN: CPT | Performed by: PODIATRIST

## 2022-10-21 ASSESSMENT — PAIN SCALES - GENERAL: PAINLEVEL: MILD PAIN (2)

## 2022-10-21 NOTE — LETTER
10/21/2022         RE: Joey Santillan  [address] [facility] [address]        Dear Colleague,    Thank you for referring your patient, [facility] Santillan[facility]. Please see a copy of my visit note below.    Assessment:      ICD-10-CM    1. Plantar fasciitis  M72.2 Orthopedic  Referral     diclofenac (VOLTAREN) 50 MG EC tablet      2. Uncontrolled type 2 diabetes mellitus with hyperglycemia (H)  E11.65 diclofenac (VOLTAREN) 50 MG EC tablet      3. Pain of right heel  M79.671            Plan:  No orders of the defined types were placed in this encounter.        Discussed the etiology and treatment of the condition with the patient.  Imaging studies reviewed and discussed with the patient.  Discussed surgical and conservative options.    -Injection- offered  -NSAID- Rx po  -Orthoses- SuperFeet, custom - offered  -Activity- low impact,calf muscle stretching.  -PT- Rx today for Graston/ASTYM,eccentric training- declined  -Immobilization- boot with orthotic, boot with heel lift - declined    Return:  No follow-ups on file.    Iva Julian DPM                Chief Complaint:     Patient presents with:  Right Foot - Pain     right heel pain    HPI:  Joey Santillan is a 62 year old year old male who presents for evaluation of heel pain.    Pain location bottom midlle  of heel.  Quality- pain is described as aching, sharp and shooting.  Severity- 2/10, symptoms are unchanged.  Duration- 2 month(s).  Timing- afternoon.  Context- occurs while he is on his feet.  Modifying factors- worsened by resting and then going back onto feet and improved by stretching.  Associated symptoms- numbness or tingling to heel or bottom of foot? No.  Prior treatments- none.        Past Medical & Surgical History:  Past Medical History:   Diagnosis Date     Hyperlipidemia LDL goal <100 10/23/2017     Pilonidal cyst      Type 2 diabetes mellitus without complication,  "without long-term current use of insulin (H) 10/23/2017      Past Surgical History:   Procedure Laterality Date     COLONOSCOPY N/A 9/10/2018    Procedure: COLONOSCOPY;  colonoscopy;  Surgeon: Mallory Rudd MD;  Location:  GI      Family History   Problem Relation Age of Onset     C.A.D. Father      Diabetes Mother      Alzheimer Disease Paternal Grandmother      Arthritis Maternal Grandmother      Diabetes Other      Cancer - colorectal No family hx of      Prostate Cancer No family hx of         Social History:  ?  History   Smoking Status     Some Days     Packs/day: 0.00     Years: 5.00     Types: Cigars, Cigarettes   Smokeless Tobacco     Never     History   Drug Use No     Social History    Substance and Sexual Activity      Alcohol use: Yes        Alcohol/week: 0.0 - 4.0 standard drinks        Comment: 1-2 drinks per week      Allergies:  ?   No Known Allergies     Medications:    Current Outpatient Medications   Medication     ACE/ARB/ARNI NOT PRESCRIBED, INTENTIONAL,     aspirin (ASPIRIN ADULT LOW STRENGTH) 81 MG PO EC tablet     diclofenac (VOLTAREN) 50 MG EC tablet     metFORMIN (GLUCOPHAGE) 500 MG tablet     rosuvastatin (CRESTOR) 5 MG tablet     No current facility-administered medications for this visit.       Physical Exam:  ?  Vitals:  /67   Pulse 71   Ht 1.867 m (6' 1.5\")   Wt 93 kg (205 lb)   BMI 26.68 kg/m     General:  WD/WN, in NAD.  A&O x3.  Dermatologic:    Skin is intact, open lesions absent.   Skin texture, turgor is normal.  Vascular:  Pulses palpable right.  Digital capillary refill time normal right.  Skin temperature is normal to affected heel.  Generalized edema- trace bilateral.  Focal edema- mild heel right.  Neurologic:    Gross sensation normal.  Gait and balance normal.  Musculoskeletal:  Maximal pain to palpation of plantar heel right.  no pain to palpation of posterior heel, Achilles tendon right.  Ankle dorsiflexion <10 degrees with the knee extended, <10 " degrees with the knee flexed.  STJ, MTJ ROM intact to affected extremity.  Muscle strength 5/5  foot and ankle to affected extremity.                   Again, thank you for allowing me to participate in the care of your patient.        Sincerely,        Iva Julian DPM

## 2022-10-21 NOTE — PROGRESS NOTES
Assessment:      ICD-10-CM    1. Plantar fasciitis  M72.2 Orthopedic  Referral     diclofenac (VOLTAREN) 50 MG EC tablet      2. Uncontrolled type 2 diabetes mellitus with hyperglycemia (H)  E11.65 diclofenac (VOLTAREN) 50 MG EC tablet      3. Pain of right heel  M79.671            Plan:  No orders of the defined types were placed in this encounter.        Discussed the etiology and treatment of the condition with the patient.  Imaging studies reviewed and discussed with the patient.  Discussed surgical and conservative options.    -Injection- offered  -NSAID- Rx po  -Orthoses- SuperFeet, custom - offered  -Activity- low impact,calf muscle stretching.  -PT- Rx today for Graston/ASTYM,eccentric training- declined  -Immobilization- boot with orthotic, boot with heel lift - declined    Return:  No follow-ups on file.    Iva Julian DPM                Chief Complaint:     Patient presents with:  Right Foot - Pain     right heel pain    HPI:  Joey Santillan is a 62 year old year old male who presents for evaluation of heel pain.    Pain location bottom midlle  of heel.  Quality- pain is described as aching, sharp and shooting.  Severity- 2/10, symptoms are unchanged.  Duration- 2 month(s).  Timing- afternoon.  Context- occurs while he is on his feet.  Modifying factors- worsened by resting and then going back onto feet and improved by stretching.  Associated symptoms- numbness or tingling to heel or bottom of foot? No.  Prior treatments- none.        Past Medical & Surgical History:  Past Medical History:   Diagnosis Date     Hyperlipidemia LDL goal <100 10/23/2017     Pilonidal cyst      Type 2 diabetes mellitus without complication, without long-term current use of insulin (H) 10/23/2017      Past Surgical History:   Procedure Laterality Date     COLONOSCOPY N/A 9/10/2018    Procedure: COLONOSCOPY;  colonoscopy;  Surgeon: Mallory Rudd MD;  Location:  GI      Family History   Problem  "Relation Age of Onset     C.A.D. Father      Diabetes Mother      Alzheimer Disease Paternal Grandmother      Arthritis Maternal Grandmother      Diabetes Other      Cancer - colorectal No family hx of      Prostate Cancer No family hx of         Social History:  ?  History   Smoking Status     Some Days     Packs/day: 0.00     Years: 5.00     Types: Cigars, Cigarettes   Smokeless Tobacco     Never     History   Drug Use No     Social History    Substance and Sexual Activity      Alcohol use: Yes        Alcohol/week: 0.0 - 4.0 standard drinks        Comment: 1-2 drinks per week      Allergies:  ?   No Known Allergies     Medications:    Current Outpatient Medications   Medication     ACE/ARB/ARNI NOT PRESCRIBED, INTENTIONAL,     aspirin (ASPIRIN ADULT LOW STRENGTH) 81 MG PO EC tablet     diclofenac (VOLTAREN) 50 MG EC tablet     metFORMIN (GLUCOPHAGE) 500 MG tablet     rosuvastatin (CRESTOR) 5 MG tablet     No current facility-administered medications for this visit.       Physical Exam:  ?  Vitals:  /67   Pulse 71   Ht 1.867 m (6' 1.5\")   Wt 93 kg (205 lb)   BMI 26.68 kg/m     General:  WD/WN, in NAD.  A&O x3.  Dermatologic:    Skin is intact, open lesions absent.   Skin texture, turgor is normal.  Vascular:  Pulses palpable right.  Digital capillary refill time normal right.  Skin temperature is normal to affected heel.  Generalized edema- trace bilateral.  Focal edema- mild heel right.  Neurologic:    Gross sensation normal.  Gait and balance normal.  Musculoskeletal:  Maximal pain to palpation of plantar heel right.  no pain to palpation of posterior heel, Achilles tendon right.  Ankle dorsiflexion <10 degrees with the knee extended, <10 degrees with the knee flexed.  STJ, MTJ ROM intact to affected extremity.  Muscle strength 5/5  foot and ankle to affected extremity.               "

## 2022-10-21 NOTE — PATIENT INSTRUCTIONS
PATIENT INSTRUCTIONS - Podiatry / Foot & Ankle Surgery    Calf muscle stretching 2-3x daily as instructed, both with the knees straight and the knees bent. Hold for 30-60 seconds.  For personal instruction on this,  please request a Physical Therapy referral from your doctor.      Low impact activity - cycling or swimming is best; then walking or elliptical.  Avoid running, jumping, and hard landings.      Diclofenac / Voltaren Gel- Apply to affected area only.  Apply 3-4 times daily for the first 3-4 days, then 1-2 times daily as needed.  Over the counter (OTC), a prescription is not needed.  Available at most pharmacies, [employer], to be.      Diclofenac / Voltaren - 1 pill twice daily x1 week.  Then take a 1 week break.  Repeat as needed.  Take with food & an acid blocker if stomach upset occurs.  Stop all other NSAIDs (aspirin, ibuprofen/Motrin, naproxen/ Aleve).        SuperFeet orthotics  SuperFeet are over the counter (OTC), you do not need a prescription.  Wear Superfeet orthotics in all of your shoes.  Remove the existing liner and replace it with SuperFeet.    SuperFeet are available on [employer], at [employer] and most [employer].                      SMOKING CESSATION  What's in cigarette smoke? - Cigarette smoke contains over 4,000 chemicals. Nicotine is one of the main ingredients which is an insecticide/herbicide. It is poisonous to our nervous system, increases blood clotting risk, and decreases the body's defenses to fight off infection. Another chemical is Carbon Monoxide is an asphyxiating gas that permanently binds to blood cells and blocks the transport of oxygen. This leads to tissue death and decreases your metabolism. Tar is a chemical that coats your lungs and trachea which impairs new oxygen coming in and carbon dioxide getting out of your body.   How does smoking impact surgery? - Smoking is particularly hazardous with regards to surgery. Surgery puts stress on the body and a smoker's  body is already under strain from these chemicals. Putting the two together, especially for an elective surgery, could be a recipe for disaster. Smoking before and after surgery increases your risk of heart problems, slow wound healing, delayed bone healing, blood clots, wound infection and anesthesia complications.   What are the benefits of quitting? - In 20 minutes your blood pressure will drop back down to normal. In 8 hours the carbon monoxide (a toxic gas) levels in your blood stream will drop by half, and oxygen levels will return to normal. In 48 hours your chance of having a heart attack will have decreased. All nicotine will have left your body. Your sense of taste and smell will return to a normal level. In 72 hours your bronchial tubes will relax, and your energy levels will increase. In 2 weeks your circulation will increase, and it will continue to improve for the next 10 weeks.    Recommendations for elective surgery - Ideally, patients should quit smoking 8 weeks before and at least 2 weeks after elective surgery in order to avoid complications. Simply cutting back on the amount of cigarettes smoked per day does not offer any benefit or decrease the risk of poor wound healing, heart problems, and infection. Smokers should also start taking Vitamin C and B for two weeks before surgery and two weeks after surgery.    Ways to Stop Smokin. Nicotine patches, lozenges, or gum  2. Support Groups  3. Medications (see below)    List of Medications:  1. Varenicline Tartrate (CHANTIX) (may be discontinued by FDA as of 2021)  2. Bupropion HCL (WELLBUTRIN, ZYBAN) - note: make sure Wellbutrin is for smoking cessation and not other issues   3. Nicotine Patch (NICODERM)   4. Nicotine Inhaler (NICOTROL)   5. Nicotine Gum Nicotine Polacrilex   6. Nicotine Lozenge: Nicotine Polacrilex (COMMIT)   * [facility] offers a smoking support group as well!  Please visit: [email or web address]  If you  are interested in these, ask about getting a prescription or talk to your primary care doctor about what may be the best way for you to quit.      Flu vaccines are now available at all [facility] clinics and retail pharmacies across the [facility]. Appointments are required for clinic locations. To schedule an appointment online, please log into [facility] or create an account if you are a new user. You can also call [phone], or simply walk in at one of the [facility] retail pharmacy locations.      [facility] - Login Page

## 2022-10-21 NOTE — NURSING NOTE
"Chief Complaint   Patient presents with     Right Foot - Pain       Initial /67   Pulse 71   Ht 1.867 m (6' 1.5\")   Wt 93 kg (205 lb)   BMI 26.68 kg/m   Estimated body mass index is 26.68 kg/m  as calculated from the following:    Height as of this encounter: 1.867 m (6' 1.5\").    Weight as of this encounter: 93 kg (205 lb).  Medications and allergies reviewed.      Galina CAMPOS MA    "

## 2022-10-22 ENCOUNTER — HEALTH MAINTENANCE LETTER (OUTPATIENT)
Age: 62
End: 2022-10-22

## 2023-01-16 DIAGNOSIS — E78.5 HYPERLIPIDEMIA LDL GOAL <100: ICD-10-CM

## 2023-01-16 RX ORDER — ROSUVASTATIN CALCIUM 5 MG/1
5 TABLET, COATED ORAL DAILY
Qty: 90 TABLET | Refills: 1 | Status: SHIPPED | OUTPATIENT
Start: 2023-01-16 | End: 2023-07-20

## 2023-06-01 ENCOUNTER — HEALTH MAINTENANCE LETTER (OUTPATIENT)
Age: 63
End: 2023-06-01

## 2023-06-07 DIAGNOSIS — E11.9 TYPE 2 DIABETES MELLITUS WITHOUT COMPLICATION, WITHOUT LONG-TERM CURRENT USE OF INSULIN (H): ICD-10-CM

## 2023-07-20 DIAGNOSIS — E78.5 HYPERLIPIDEMIA LDL GOAL <100: ICD-10-CM

## 2023-07-20 RX ORDER — ROSUVASTATIN CALCIUM 5 MG/1
TABLET, COATED ORAL
Qty: 90 TABLET | Refills: 1 | Status: SHIPPED | OUTPATIENT
Start: 2023-07-20 | End: 2024-01-23

## 2023-08-27 ENCOUNTER — HEALTH MAINTENANCE LETTER (OUTPATIENT)
Age: 63
End: 2023-08-27

## 2023-08-28 ENCOUNTER — TRANSFERRED RECORDS (OUTPATIENT)
Dept: HEALTH INFORMATION MANAGEMENT | Facility: CLINIC | Age: 63
End: 2023-08-28
Payer: COMMERCIAL

## 2023-08-28 LAB — RETINOPATHY: NEGATIVE

## 2023-09-20 ENCOUNTER — MYC REFILL (OUTPATIENT)
Dept: FAMILY MEDICINE | Facility: CLINIC | Age: 63
End: 2023-09-20
Payer: COMMERCIAL

## 2023-09-20 DIAGNOSIS — E11.9 TYPE 2 DIABETES MELLITUS WITHOUT COMPLICATION, WITHOUT LONG-TERM CURRENT USE OF INSULIN (H): ICD-10-CM

## 2023-11-14 ASSESSMENT — ENCOUNTER SYMPTOMS
NERVOUS/ANXIOUS: 0
COUGH: 0
SHORTNESS OF BREATH: 0
ARTHRALGIAS: 0
FREQUENCY: 0
JOINT SWELLING: 0
EYE PAIN: 0
WEAKNESS: 0
CHILLS: 0
MYALGIAS: 0
SORE THROAT: 0
FEVER: 0
CONSTIPATION: 0
ABDOMINAL PAIN: 0
HEARTBURN: 0
DYSURIA: 0
PARESTHESIAS: 0
DIARRHEA: 0
DIZZINESS: 0
PALPITATIONS: 0
HEMATOCHEZIA: 0
HEADACHES: 0
NAUSEA: 0
HEMATURIA: 0

## 2023-11-14 NOTE — PROGRESS NOTES
SUBJECTIVE:   CC: Matt is an 63 year old who presents for preventative health visit.     Healthy Habits:     Getting at least 3 servings of Calcium per day:  Yes    Bi-annual eye exam:  Yes    Dental care twice a year:  NO    Sleep apnea or symptoms of sleep apnea:  None    Diet:  Diabetic    Frequency of exercise:  4-5 days/week    Duration of exercise:  45-60 minutes    Taking medications regularly:  Yes    Medication side effects:  Not applicable    Additional concerns today:  No      Today's PHQ-2 Score:       11/14/2023     4:14 PM   PHQ-2 ( 1999 [employer])   Q1: Little interest or pleasure in doing things 0   Q2: Feeling down, depressed or hopeless 0   PHQ-2 Score 0   Q1: Little interest or pleasure in doing things Not at all   Q2: Feeling down, depressed or hopeless Not at all   PHQ-2 Score 0                 Social History     Tobacco Use    Smoking status: Some Days     Packs/day: 0.00     Years: 5.00     Additional pack years: 0.00     Total pack years: 0.00     Types: Cigarettes, Cigars    Smokeless tobacco: Never   Substance Use Topics    Alcohol use: Yes     Alcohol/week: 0.0 - 4.0 standard drinks of alcohol     Comment: very light             11/14/2023     4:13 PM   Alcohol Use   Prescreen: >3 drinks/day or >7 drinks/week? No          No data to display                Last PSA:   PSA   Date Value Ref Range Status   01/07/2021 0.97 0 - 4 ug/L Final     Comment:     Assay Method:  Chemiluminescence using Siemens Vista analyzer     Prostate Specific Antigen Screen   Date Value Ref Range Status   07/08/2022 1.40 0.00 - 4.00 ug/L Final       Reviewed orders with patient. Reviewed health maintenance and updated orders accordingly - Yes  BP Readings from Last 3 Encounters:   11/15/23 121/79   10/21/22 106/67   10/17/22 118/75    Wt Readings from Last 3 Encounters:   11/15/23 94.8 kg (209 lb)   10/21/22 93 kg (205 lb)   10/17/22 93 kg (205 lb)                  Patient Active Problem List   Diagnosis    Overweight     Type 2 diabetes mellitus without complication, without long-term current use of insulin (H)    Hyperlipidemia LDL goal <100     Past Surgical History:   Procedure Laterality Date    COLONOSCOPY N/A 9/10/2018    Procedure: COLONOSCOPY;  colonoscopy;  Surgeon: Mallory Rudd MD;  Location:  GI       Social History     Tobacco Use    Smoking status: Some Days     Packs/day: 0.00     Years: 5.00     Additional pack years: 0.00     Total pack years: 0.00     Types: Cigarettes, Cigars    Smokeless tobacco: Never   Substance Use Topics    Alcohol use: Yes     Alcohol/week: 0.0 - 4.0 standard drinks of alcohol     Comment: very light     Family History   Problem Relation Age of Onset    C.A.D. Father     Diabetes Mother     Alzheimer Disease Paternal Grandmother     Arthritis Maternal Grandmother     Diabetes Other     Cancer - colorectal No family hx of     Prostate Cancer No family hx of          Current Outpatient Medications   Medication Sig Dispense Refill    ACE/ARB/ARNI NOT PRESCRIBED, INTENTIONAL, Please choose reason not prescribed, below      aspirin (ASPIRIN ADULT LOW STRENGTH) 81 MG PO EC tablet Take 1 tablet (81 mg) by mouth daily 90 tablet 1    metFORMIN (GLUCOPHAGE) 500 MG tablet Take 1 tablet (500 mg) by mouth 2 times daily (with meals) 180 tablet 0    rosuvastatin (CRESTOR) 5 MG tablet TAKE 1 TABLET BY MOUTH EVERY DAY 90 tablet 1       Reviewed and updated as needed this visit by clinical staff   Tobacco  Allergies  Meds              Reviewed and updated as needed this visit by Provider                 Past Medical History:   Diagnosis Date    Hyperlipidemia LDL goal <100 10/23/2017    Pilonidal cyst     Type 2 diabetes mellitus without complication, without long-term current use of insulin (H) 10/23/2017      Past Surgical History:   Procedure Laterality Date    COLONOSCOPY N/A 9/10/2018    Procedure: COLONOSCOPY;  colonoscopy;  Surgeon: Mallory Rudd MD;  Location:  GI  "      Review of Systems   Constitutional:  Negative for chills and fever.   HENT:  Negative for congestion, ear pain, hearing loss and sore throat.    Eyes:  Negative for pain and visual disturbance.   Respiratory:  Negative for cough and shortness of breath.    Cardiovascular:  Negative for chest pain, palpitations and peripheral edema.   Gastrointestinal:  Negative for abdominal pain, constipation, diarrhea, heartburn, hematochezia and nausea.   Genitourinary:  Negative for dysuria, frequency, genital sores, hematuria, impotence, penile discharge and urgency.   Musculoskeletal:  Negative for arthralgias, joint swelling and myalgias.   Skin:  Negative for rash.   Neurological:  Negative for dizziness, weakness, headaches and paresthesias.   Psychiatric/Behavioral:  Negative for mood changes. The patient is not nervous/anxious.          OBJECTIVE:   /79 (BP Location: Left arm, Patient Position: Sitting, Cuff Size: Adult Large)   Pulse 65   Temp 97.8  F (36.6  C)   Resp 16   Ht 1.865 m (6' 1.43\")   Wt 94.8 kg (209 lb)   SpO2 98%   BMI 27.26 kg/m      Physical Exam  GENERAL: healthy, alert and no distress  EYES: Eyes grossly normal to inspection, PERRL and conjunctivae and sclerae normal  HENT: ear canals and TM's normal, nose and mouth without ulcers or lesions  NECK: no adenopathy, no asymmetry, masses, or scars and thyroid normal to palpation  RESP: lungs clear to auscultation - no rales, rhonchi or wheezes  CV: regular rate and rhythm, normal S1 S2, no S3 or S4, no murmur, click or rub, no peripheral edema and peripheral pulses strong  ABDOMEN: soft, nontender, no hepatosplenomegaly, no masses and bowel sounds normal  RECTAL: normal sphincter tone, no rectal masses and prostate of normal size for age, smooth, nontender without masses/nodules  MS: no gross musculoskeletal defects noted, no edema  SKIN: no suspicious lesions or rashes  NEURO: Normal strength and tone, mentation intact and speech " normal  PSYCH: mentation appears normal, affect normal/bright  Diabetic foot exam: normal DP and PT pulses, no trophic changes or ulcerative lesions, and normal sensory exam        ASSESSMENT/PLAN:       ICD-10-CM    1. Routine general medical examination at a health care facility  Z00.00 CBC with platelets     Comprehensive metabolic panel      2. Type 2 diabetes mellitus without complication, without long-term current use of insulin (H)  E11.9 HEMOGLOBIN A1C     Lipid panel reflex to direct LDL Non-fasting     Albumin Random Urine Quantitative with Creat Ratio     FOOT EXAM      3. Hyperlipidemia LDL goal <100  E78.5       4. Screening for prostate cancer  Z12.5 PROSTATE SPEC ANTIGEN SCREEN      -recheck diabetes labs  -recheck lipids            COUNSELING:   Reviewed preventive health counseling, as reflected in patient instructions  Special attention given to:        Regular exercise       Healthy diet/nutrition       Immunizations  He will get shingrix at pharmacy, he declines flu shot, covid shot           Colorectal cancer screening; repeat 2028       Prostate cancer screening; PSA        Consider lung cancer screening for ages 55-80 years (77 for Medicare) and 20 pack-year smoking history ; only rare cigar use         He reports that he has been smoking cigarettes and cigars. He has never used smokeless tobacco.  Nicotine/Tobacco Cessation Plan:   He smokes rare cigars, I told him that there is no safe amount of tobacco           Narciso Cisneros MD  [facility]

## 2023-11-15 ENCOUNTER — OFFICE VISIT (OUTPATIENT)
Dept: FAMILY MEDICINE | Facility: CLINIC | Age: 63
End: 2023-11-15
Payer: COMMERCIAL

## 2023-11-15 VITALS
BODY MASS INDEX: 27.7 KG/M2 | SYSTOLIC BLOOD PRESSURE: 121 MMHG | HEART RATE: 65 BPM | DIASTOLIC BLOOD PRESSURE: 79 MMHG | TEMPERATURE: 97.8 F | WEIGHT: 209 LBS | RESPIRATION RATE: 16 BRPM | OXYGEN SATURATION: 98 % | HEIGHT: 73 IN

## 2023-11-15 DIAGNOSIS — Z00.00 ROUTINE GENERAL MEDICAL EXAMINATION AT A HEALTH CARE FACILITY: Primary | ICD-10-CM

## 2023-11-15 DIAGNOSIS — Z12.5 SCREENING FOR PROSTATE CANCER: ICD-10-CM

## 2023-11-15 DIAGNOSIS — E11.9 TYPE 2 DIABETES MELLITUS WITHOUT COMPLICATION, WITHOUT LONG-TERM CURRENT USE OF INSULIN (H): ICD-10-CM

## 2023-11-15 DIAGNOSIS — E78.5 HYPERLIPIDEMIA LDL GOAL <100: ICD-10-CM

## 2023-11-15 LAB
ALBUMIN SERPL BCG-MCNC: 4.5 G/DL (ref 3.5–5.2)
ALP SERPL-CCNC: 60 U/L (ref 40–150)
ALT SERPL W P-5'-P-CCNC: 25 U/L (ref 0–70)
ANION GAP SERPL CALCULATED.3IONS-SCNC: 9 MMOL/L (ref 7–15)
AST SERPL W P-5'-P-CCNC: 25 U/L (ref 0–45)
BILIRUB SERPL-MCNC: 0.6 MG/DL
BUN SERPL-MCNC: 20.2 MG/DL (ref 8–23)
CALCIUM SERPL-MCNC: 8.9 MG/DL (ref 8.8–10.2)
CHLORIDE SERPL-SCNC: 101 MMOL/L (ref 98–107)
CHOLEST SERPL-MCNC: 125 MG/DL
CREAT SERPL-MCNC: 0.86 MG/DL (ref 0.67–1.17)
CREAT UR-MCNC: 197 MG/DL
DEPRECATED HCO3 PLAS-SCNC: 28 MMOL/L (ref 22–29)
EGFRCR SERPLBLD CKD-EPI 2021: >90 ML/MIN/1.73M2
ERYTHROCYTE [DISTWIDTH] IN BLOOD BY AUTOMATED COUNT: 11.8 % (ref 10–15)
GLUCOSE SERPL-MCNC: 227 MG/DL (ref 70–99)
HBA1C MFR BLD: 8.6 % (ref 0–5.6)
HCT VFR BLD AUTO: 44.3 % (ref 40–53)
HDLC SERPL-MCNC: 49 MG/DL
HGB BLD-MCNC: 15.1 G/DL (ref 13.3–17.7)
LDLC SERPL CALC-MCNC: 57 MG/DL
MCH RBC QN AUTO: 31.3 PG (ref 26.5–33)
MCHC RBC AUTO-ENTMCNC: 34.1 G/DL (ref 31.5–36.5)
MCV RBC AUTO: 92 FL (ref 78–100)
MICROALBUMIN UR-MCNC: <12 MG/L
MICROALBUMIN/CREAT UR: NORMAL MG/G{CREAT}
NONHDLC SERPL-MCNC: 76 MG/DL
PLATELET # BLD AUTO: 181 10E3/UL (ref 150–450)
POTASSIUM SERPL-SCNC: 4.4 MMOL/L (ref 3.4–5.3)
PROT SERPL-MCNC: 7.1 G/DL (ref 6.4–8.3)
PSA SERPL DL<=0.01 NG/ML-MCNC: 1.11 NG/ML (ref 0–4.5)
RBC # BLD AUTO: 4.82 10E6/UL (ref 4.4–5.9)
SODIUM SERPL-SCNC: 138 MMOL/L (ref 135–145)
TRIGL SERPL-MCNC: 93 MG/DL
WBC # BLD AUTO: 4.1 10E3/UL (ref 4–11)

## 2023-11-15 PROCEDURE — 80061 LIPID PANEL: CPT | Performed by: INTERNAL MEDICINE

## 2023-11-15 PROCEDURE — 83036 HEMOGLOBIN GLYCOSYLATED A1C: CPT | Performed by: INTERNAL MEDICINE

## 2023-11-15 PROCEDURE — 99207 PR FOOT EXAM NO CHARGE: CPT | Performed by: INTERNAL MEDICINE

## 2023-11-15 PROCEDURE — 99396 PREV VISIT EST AGE 40-64: CPT | Mod: 25 | Performed by: INTERNAL MEDICINE

## 2023-11-15 PROCEDURE — 36415 COLL VENOUS BLD VENIPUNCTURE: CPT | Performed by: INTERNAL MEDICINE

## 2023-11-15 PROCEDURE — 82570 ASSAY OF URINE CREATININE: CPT | Performed by: INTERNAL MEDICINE

## 2023-11-15 PROCEDURE — 80053 COMPREHEN METABOLIC PANEL: CPT | Performed by: INTERNAL MEDICINE

## 2023-11-15 PROCEDURE — 82043 UR ALBUMIN QUANTITATIVE: CPT | Performed by: INTERNAL MEDICINE

## 2023-11-15 PROCEDURE — G0103 PSA SCREENING: HCPCS | Performed by: INTERNAL MEDICINE

## 2023-11-15 PROCEDURE — 85027 COMPLETE CBC AUTOMATED: CPT | Performed by: INTERNAL MEDICINE

## 2023-11-15 ASSESSMENT — PAIN SCALES - GENERAL: PAINLEVEL: NO PAIN (0)

## 2023-11-15 NOTE — RESULT ENCOUNTER NOTE
The following letter pertains to your most recent diagnostic tests:    -Liver and gallbladder tests are normal for you. (ALT,AST, Alk phos, bilirubin), kidney function is normal for you (Creatinine, GFR), Sodium is normal, Potassium is normal for you, Calcium is normal for you.    -Your microalbumin level which is a diabetes urine test to check for any evidence of early kidney injury from diabetes returned negative.     -Your cholesterol panel looks healthy.     -Your prostate specific antigen (PSA) test result returned normal.     -Your hemoglobin A1c test which averages your blood sugars over the last 3 months returned at 8.6 which is ABOVE your goal of hemoglobin A1c at least less than 8.       Bottom line:  The blood sugar has increased considerably since last week.  To address this I think you have two options.    1.  Work really hard on cutting back on carbohydrates and increasing physical activity and recheck the hemoglobin A1c in 3 months.    2.  Start Ozempic injections weekly to get A1c down.      Follow up:  Reply or contact us if you would like to get the Ozempic started.  Either way return for a lab appointment 3 months to recheck the hemoglobin A1c.        Sincerely,    Dr. Cisneros

## 2023-12-18 ENCOUNTER — TELEPHONE (OUTPATIENT)
Dept: ENDOCRINOLOGY | Facility: CLINIC | Age: 63
End: 2023-12-18
Payer: COMMERCIAL

## 2023-12-18 ENCOUNTER — LAB REQUISITION (OUTPATIENT)
Dept: LAB | Facility: CLINIC | Age: 63
End: 2023-12-18

## 2023-12-18 ENCOUNTER — HOSPITAL ENCOUNTER (OUTPATIENT)
Dept: RESEARCH | Facility: CLINIC | Age: 63
Discharge: HOME | End: 2023-12-18
Attending: INTERNAL MEDICINE
Payer: COMMERCIAL

## 2023-12-18 LAB
ALT SERPL W P-5'-P-CCNC: 20 U/L (ref 0–70)
AST SERPL W P-5'-P-CCNC: 20 U/L (ref 0–45)
CHOLEST SERPL-MCNC: 136 MG/DL
CREAT SERPL-MCNC: 0.94 MG/DL (ref 0.67–1.17)
EGFRCR SERPLBLD CKD-EPI 2021: >90 ML/MIN/1.73M2
FASTING STATUS PATIENT QL REPORTED: ABNORMAL
FASTING STATUS PATIENT QL REPORTED: NORMAL
GLUCOSE SERPL-MCNC: 202 MG/DL (ref 70–99)
HBA1C MFR BLD: 7.7 %
HDLC SERPL-MCNC: 46 MG/DL
HGB BLD-MCNC: 14.9 G/DL (ref 13.3–17.7)
HOLD SPECIMEN: NORMAL
INSULIN SERPL-ACNC: 8.9 UU/ML (ref 2.6–24.9)
LDLC SERPL CALC-MCNC: 71 MG/DL
NONHDLC SERPL-MCNC: 90 MG/DL
TRIGL SERPL-MCNC: 94 MG/DL
TSH SERPL DL<=0.005 MIU/L-ACNC: 2.35 UIU/ML (ref 0.3–4.2)

## 2023-12-18 PROCEDURE — 82565 ASSAY OF CREATININE: CPT | Performed by: INTERNAL MEDICINE

## 2023-12-18 PROCEDURE — 84443 ASSAY THYROID STIM HORMONE: CPT | Performed by: INTERNAL MEDICINE

## 2023-12-18 PROCEDURE — 85018 HEMOGLOBIN: CPT | Performed by: INTERNAL MEDICINE

## 2023-12-18 PROCEDURE — 83036 HEMOGLOBIN GLYCOSYLATED A1C: CPT | Performed by: INTERNAL MEDICINE

## 2023-12-18 PROCEDURE — 80061 LIPID PANEL: CPT | Performed by: INTERNAL MEDICINE

## 2023-12-18 PROCEDURE — 82947 ASSAY GLUCOSE BLOOD QUANT: CPT | Performed by: INTERNAL MEDICINE

## 2023-12-18 PROCEDURE — 84460 ALANINE AMINO (ALT) (SGPT): CPT | Performed by: INTERNAL MEDICINE

## 2023-12-18 PROCEDURE — 84450 TRANSFERASE (AST) (SGOT): CPT | Performed by: INTERNAL MEDICINE

## 2023-12-18 PROCEDURE — 510N000009 HC RESEARCH FACILITY, PER 15 MIN

## 2023-12-18 PROCEDURE — 300N000003 HC RESEARCH SPECIMEN PROCESSING, SIMPLE

## 2023-12-18 PROCEDURE — 83525 ASSAY OF INSULIN: CPT | Performed by: INTERNAL MEDICINE

## 2023-12-18 NOTE — TELEPHONE ENCOUNTER
-  Dear Matt    Here are your labs -your HgbA1c is 7.7 - you qualify for the study and we appreciate the chance to work with you    Warmest Regards    Jeanie Mar and the S3 study team     Lab Requisition on 12/18/2023   Component Date Value Ref Range Status     Cholesterol 12/18/2023 136  <200 mg/dL Final     Triglycerides 12/18/2023 94  <150 mg/dL Final     Direct Measure HDL 12/18/2023 46  >=40 mg/dL Final     LDL Cholesterol Calculated 12/18/2023 71  <=100 mg/dL Final     Non HDL Cholesterol 12/18/2023 90  <130 mg/dL Final     Patient Fasting > 8hrs? 12/18/2023 Unknown   Final     Hemoglobin A1C 12/18/2023 7.7 (H)  <5.7 % Final    Normal <5.7%   Prediabetes 5.7-6.4%    Diabetes 6.5% or higher     Note: Adopted from ADA consensus guidelines.     Glucose 12/18/2023 202 (H)  70 - 99 mg/dL Final     Patient Fasting > 8hrs? 12/18/2023 Unknown   Final     AST 12/18/2023 20  0 - 45 U/L Final    Reference intervals for this test were updated on 6/12/2023 to more accurately reflect our healthy population. There may be differences in the flagging of prior results with similar values performed with this method. Interpretation of those prior results can be made in the context of the updated reference intervals.     ALT 12/18/2023 20  0 - 70 U/L Final    Reference intervals for this test were updated on 6/12/2023 to more accurately reflect our healthy population. There may be differences in the flagging of prior results with similar values performed with this method. Interpretation of those prior results can be made in the context of the updated reference intervals.       TSH 12/18/2023 2.35  0.30 - 4.20 uIU/mL Final     Creatinine 12/18/2023 0.94  0.67 - 1.17 mg/dL Final     GFR Estimate 12/18/2023 >90  >60 mL/min/1.73m2 Final     Hemoglobin 12/18/2023 14.9  13.3 - 17.7 g/dL Final     Insulin 12/18/2023 8.9  2.6 - 24.9 uU/mL Final     Hold Specimen 12/18/2023 JI   Final

## 2023-12-19 DIAGNOSIS — E11.9 TYPE 2 DIABETES MELLITUS WITHOUT COMPLICATION, WITHOUT LONG-TERM CURRENT USE OF INSULIN (H): ICD-10-CM

## 2023-12-19 NOTE — TELEPHONE ENCOUNTER
Prescription approved per [facility] Refill Protocol.  Mony Lin, RN  [facility] Triage Nurse

## 2024-01-23 DIAGNOSIS — E78.5 HYPERLIPIDEMIA LDL GOAL <100: ICD-10-CM

## 2024-01-23 RX ORDER — ROSUVASTATIN CALCIUM 5 MG/1
TABLET, COATED ORAL
Qty: 90 TABLET | Refills: 2 | Status: SHIPPED | OUTPATIENT
Start: 2024-01-23 | End: 2024-08-05

## 2024-02-29 ENCOUNTER — OFFICE VISIT (OUTPATIENT)
Dept: URGENT CARE | Facility: URGENT CARE | Age: 64
End: 2024-02-29
Payer: COMMERCIAL

## 2024-02-29 VITALS
OXYGEN SATURATION: 98 % | DIASTOLIC BLOOD PRESSURE: 62 MMHG | SYSTOLIC BLOOD PRESSURE: 121 MMHG | HEART RATE: 81 BPM | TEMPERATURE: 97.3 F

## 2024-02-29 DIAGNOSIS — H92.02 OTALGIA, LEFT: Primary | ICD-10-CM

## 2024-02-29 PROCEDURE — 99213 OFFICE O/P EST LOW 20 MIN: CPT | Performed by: PHYSICIAN ASSISTANT

## 2024-02-29 RX ORDER — NEOMYCIN SULFATE, POLYMYXIN B SULFATE AND HYDROCORTISONE 10; 3.5; 1 MG/ML; MG/ML; [USP'U]/ML
3 SUSPENSION/ DROPS AURICULAR (OTIC) 3 TIMES DAILY
Qty: 10 ML | Refills: 0 | Status: SHIPPED | OUTPATIENT
Start: 2024-02-29 | End: 2024-03-07

## 2024-02-29 ASSESSMENT — ENCOUNTER SYMPTOMS
FEVER: 0
SORE THROAT: 1

## 2024-02-29 NOTE — PROGRESS NOTES
SUBJECTIVE:   Joey Santillan is a 63 year old male presenting with a chief complaint of   Chief Complaint   Patient presents with    Urgent Care     Left ear pain x 2 weeks        He is an established patient of [facility].  Left ear pain x 12 days.  ST, small    Treatment:  tylenol - no help    Review of Systems   Constitutional:  Negative for fever.   HENT:  Positive for ear pain and sore throat.    All other systems reviewed and are negative.      Past Medical History:   Diagnosis Date    Hyperlipidemia LDL goal <100 10/23/2017    Pilonidal cyst     Type 2 diabetes mellitus without complication, without long-term current use of insulin (H) 10/23/2017     Family History   Problem Relation Age of Onset    C.A.D. Father     Diabetes Mother     Alzheimer Disease Paternal Grandmother     Arthritis Maternal Grandmother     Diabetes Other     Cancer - colorectal No family hx of     Prostate Cancer No family hx of      Current Outpatient Medications   Medication Sig Dispense Refill    neomycin-polymyxin-hydrocortisone (CORTISPORIN) 3.5-64767-6 otic suspension Place 3 drops Into the left ear 3 times daily for 7 days 10 mL 0    ACE/ARB/ARNI NOT PRESCRIBED, INTENTIONAL, Please choose reason not prescribed, below      aspirin (ASPIRIN ADULT LOW STRENGTH) 81 MG PO EC tablet Take 1 tablet (81 mg) by mouth daily 90 tablet 1    metFORMIN (GLUCOPHAGE) 500 MG tablet TAKE 1 TABLET BY MOUTH TWICE A DAY WITH MEALS 180 tablet 0    rosuvastatin (CRESTOR) 5 MG tablet TAKE 1 TABLET BY MOUTH EVERY DAY 90 tablet 2     Social History     Tobacco Use    Smoking status: Some Days     Packs/day: 0.00     Years: 5.00     Additional pack years: 0.00     Total pack years: 0.00     Types: Cigarettes, Cigars    Smokeless tobacco: Never   Substance Use Topics    Alcohol use: Yes     Alcohol/week: 0.0 - 4.0 standard drinks of alcohol     Comment: very light       OBJECTIVE  /62   Pulse 81   Temp 97.3  F (36.3  C) (Tympanic)   SpO2 98%      Physical Exam  Vitals and nursing note reviewed.   Constitutional:       Appearance: Normal appearance. He is normal weight.   HENT:      Head: Normocephalic and atraumatic.      Right Ear: Tympanic membrane, ear canal and external ear normal.      Left Ear: Tympanic membrane, ear canal and external ear normal.      Nose: Nose normal.      Mouth/Throat:      Mouth: Mucous membranes are moist.      Pharynx: Oropharynx is clear.   Eyes:      Extraocular Movements: Extraocular movements intact.      Conjunctiva/sclera: Conjunctivae normal.   Cardiovascular:      Rate and Rhythm: Normal rate and regular rhythm.      Pulses: Normal pulses.      Heart sounds: Normal heart sounds.   Pulmonary:      Effort: Pulmonary effort is normal.      Breath sounds: Normal breath sounds.   Musculoskeletal:      Cervical back: Normal range of motion and neck supple. No rigidity. No muscular tenderness.   Skin:     General: Skin is warm and dry.   Neurological:      General: No focal deficit present.      Mental Status: He is alert.   Psychiatric:         Mood and Affect: Mood normal.         Behavior: Behavior normal.         Labs:  No results found for this or any previous visit (from the past 24 hour(s)).    ASSESSMENT:      ICD-10-CM    1. Otalgia, left  H92.02 neomycin-polymyxin-hydrocortisone (CORTISPORIN) 3.5-17944-5 otic suspension           Medical Decision Making:    Differential Diagnosis:  URI Adult/Peds:  Acute right otitis media, Acute left otitis media, and Otitis externa    Serious Comorbid Conditions:  Adult:   reviewed    PLAN:    Rx for cortisporin.  Discussed reasons to seek immediate medical attention.  Additionally if no improvement or worsening in one week, may follow up with PCP and/or UC.        Followup:    If not improving or if condition worsens, follow up with your Primary Care Provider, If not improving or if conditions worsens over the next 12-24 hours, go to the Emergency Department    There are no  Patient Instructions on file for this visit.

## 2024-03-18 DIAGNOSIS — E11.9 TYPE 2 DIABETES MELLITUS WITHOUT COMPLICATION, WITHOUT LONG-TERM CURRENT USE OF INSULIN (H): ICD-10-CM

## 2024-03-29 ENCOUNTER — TELEPHONE (OUTPATIENT)
Dept: ENDOCRINOLOGY | Facility: CLINIC | Age: 64
End: 2024-03-29
Payer: COMMERCIAL

## 2024-03-29 ENCOUNTER — LAB REQUISITION (OUTPATIENT)
Dept: LAB | Facility: CLINIC | Age: 64
End: 2024-03-29

## 2024-03-29 ENCOUNTER — HOSPITAL ENCOUNTER (OUTPATIENT)
Dept: RESEARCH | Facility: CLINIC | Age: 64
Discharge: HOME | End: 2024-03-29
Attending: INTERNAL MEDICINE | Admitting: INTERNAL MEDICINE
Payer: COMMERCIAL

## 2024-03-29 LAB
CHOLEST SERPL-MCNC: 122 MG/DL
FASTING STATUS PATIENT QL REPORTED: ABNORMAL
FASTING STATUS PATIENT QL REPORTED: NORMAL
GLUCOSE SERPL-MCNC: 157 MG/DL (ref 70–99)
HBA1C MFR BLD: 7.2 %
HDLC SERPL-MCNC: 50 MG/DL
HOLD SPECIMEN: NORMAL
INSULIN SERPL-ACNC: 7.1 UU/ML (ref 2.6–24.9)
LDLC SERPL CALC-MCNC: 58 MG/DL
NONHDLC SERPL-MCNC: 72 MG/DL
TRIGL SERPL-MCNC: 70 MG/DL

## 2024-03-29 PROCEDURE — 510N000017 HC CRU PATIENT CARE, PER 15 MIN

## 2024-03-29 PROCEDURE — 82947 ASSAY GLUCOSE BLOOD QUANT: CPT | Performed by: INTERNAL MEDICINE

## 2024-03-29 PROCEDURE — 510N000009 HC RESEARCH FACILITY, PER 15 MIN

## 2024-03-29 PROCEDURE — 83036 HEMOGLOBIN GLYCOSYLATED A1C: CPT | Performed by: INTERNAL MEDICINE

## 2024-03-29 PROCEDURE — 83525 ASSAY OF INSULIN: CPT | Performed by: INTERNAL MEDICINE

## 2024-03-29 PROCEDURE — 80061 LIPID PANEL: CPT | Performed by: INTERNAL MEDICINE

## 2024-03-29 PROCEDURE — 300N000003 HC RESEARCH SPECIMEN PROCESSING, SIMPLE

## 2024-03-29 NOTE — TELEPHONE ENCOUNTER
Pt group home done with [facility] - glucose levels have improved    -  Lab Requisition on 03/29/2024   Component Date Value Ref Range Status     Hemoglobin A1C 03/29/2024 7.2 (H)  <5.7 % Final    Normal <5.7%   Prediabetes 5.7-6.4%    Diabetes 6.5% or higher     Note: Adopted from ADA consensus guidelines.     Cholesterol 03/29/2024 122  <200 mg/dL Final     Triglycerides 03/29/2024 70  <150 mg/dL Final     Direct Measure HDL 03/29/2024 50  >=40 mg/dL Final     LDL Cholesterol Calculated 03/29/2024 58  <=100 mg/dL Final     Non HDL Cholesterol 03/29/2024 72  <130 mg/dL Final     Patient Fasting > 8hrs? 03/29/2024 Unknown   Final     Insulin 03/29/2024 7.1  2.6 - 24.9 uU/mL Final     Glucose 03/29/2024 157 (H)  70 - 99 mg/dL Final     Patient Fasting > 8hrs? 03/29/2024 Unknown   Final     Hold Specimen 03/29/2024 [facility]   Final

## 2024-03-29 NOTE — ADDENDUM NOTE
Encounter addended by: Ekaterina Rabago RN on: 3/29/2024 11:37 AM   Actions taken: Charge Capture section accepted

## 2024-05-22 ENCOUNTER — VIRTUAL VISIT (OUTPATIENT)
Dept: FAMILY MEDICINE | Facility: CLINIC | Age: 64
End: 2024-05-22
Payer: COMMERCIAL

## 2024-05-22 DIAGNOSIS — I48.92 ATRIAL FIBRILLATION/FLUTTER (H): Primary | ICD-10-CM

## 2024-05-22 DIAGNOSIS — I48.91 ATRIAL FIBRILLATION/FLUTTER (H): Primary | ICD-10-CM

## 2024-05-22 PROCEDURE — 99213 OFFICE O/P EST LOW 20 MIN: CPT | Mod: 95 | Performed by: INTERNAL MEDICINE

## 2024-05-22 NOTE — PROGRESS NOTES
"Matt is a 63 year old who is being evaluated via a billable video visit.    How would you like to obtain your AVS?   If the video visit is dropped, the invitation should be resent by:   Will anyone else be joining your video visit?       Assessment & Plan     Atrial fibrillation/flutter (H)  Patient informed of incidental atrial fibrillation flutter on a cardiac monitor ordered because he was enrolled in a clinical trial.  This was not associated with any symptoms.  Also, there was an interval of increased alcohol intake and tobacco intake from his baseline during the monitoring period which suggest that the atrial fibrillation flutter might have reflected \"holiday heart\".    We discussed options for further characterizing his cardiac arrhythmia including repeating the heart monitor to quantify the burden of atrial fibrillation flutter which would help us understand any associated stroke risk.  Also, we discussed the option of obtaining echocardiogram to look for any underlying structural or valvular heart disease that might predispose him for atrial fibrillation/flutter.    He would like for me to review the tracings of his cardiac monitor that was obtained through his clinical trial and will work with the staff of his clinical trial to have a copy of the report sent to me.  If this is not possible, he will inform me and we will repeat the cardiac monitor.  Unless the cardiac monitor shows significant burden of atrial fibrillation/flutter he declined having a echocardiogram.    We had a thorough discussion about the relationship between alcohol intake and the risk for atrial fibrillation/flutter and I recommended that he not overindulge in order to reduce the risk of recurrent episodes.    His Chadsvasc2 is 1, so even if he does have prolonged atrial fibrillation flutter, the risk-benefit ratio probably does not favor anticoagulation for stroke prophylaxis unless other risk factors occur.              FUTURE " APPOINTMENTS:       - Follow-up for annual visit or as needed    Subjective   Matt is a 63 year old, presenting for the following health issues:  Atrial Fib (Resulted from a heart monitor during [facility] study)    HPI     Matt is enrolled in a clinical trial that involves cardiac monitoring.  The cardiac monitoring started on April 10th and continued through April 24.  Between April 19 and April 23 tameka took a trip to [place] at which time he was visiting friends and imbibing more alcohol than he normally does.  He also admits that he was smoking some cigars over that weekend.  He was recently contacted by staff from his clinical trial that his cardiac monitor showed episodes of atrial fibrillation/flutter in his words during the monitoring period.  Interestingly, Matt had no symptoms of chest pain, palpitations or shortness of breath or syncope or near syncope while wearing the monitor.  He had just finished a workout when he got on the video visit with me and noted that he performed vigorous physical activity without any chest pains or new shortness of breath.            Objective           Vitals:  No vitals were obtained today due to virtual visit.    Physical Exam   GENERAL: alert and no distress  EYES: Eyes grossly normal to inspection.  No discharge or erythema, or obvious scleral/conjunctival abnormalities.  RESP: No audible wheeze, cough, or visible cyanosis.    SKIN: Visible skin clear. No significant rash, abnormal pigmentation or lesions.  NEURO: Cranial nerves grossly intact.  Mentation and speech appropriate for age.  PSYCH: Appropriate affect, tone, and pace of words          Video-Visit Details    Type of service:  Video Visit   Originating Location (pt. Location): Home    Distant Location (provider location):  On-site  Platform used for Video Visit: Caleb  Signed Electronically by: Narciso Cisneros MD

## 2024-06-11 ENCOUNTER — MYC MEDICAL ADVICE (OUTPATIENT)
Dept: FAMILY MEDICINE | Facility: CLINIC | Age: 64
End: 2024-06-11
Payer: COMMERCIAL

## 2024-06-11 DIAGNOSIS — I49.9 IRREGULAR HEART RHYTHM: Primary | ICD-10-CM

## 2024-06-12 ENCOUNTER — ORDERS ONLY (AUTO-RELEASED) (OUTPATIENT)
Dept: FAMILY MEDICINE | Facility: CLINIC | Age: 64
End: 2024-06-12
Payer: COMMERCIAL

## 2024-06-12 DIAGNOSIS — I49.9 IRREGULAR HEART RHYTHM: ICD-10-CM

## 2024-06-21 ENCOUNTER — HOSPITAL ENCOUNTER (OUTPATIENT)
Dept: RESEARCH | Facility: CLINIC | Age: 64
Discharge: HOME | End: 2024-06-21
Attending: INTERNAL MEDICINE | Admitting: INTERNAL MEDICINE
Payer: COMMERCIAL

## 2024-06-21 ENCOUNTER — LAB REQUISITION (OUTPATIENT)
Dept: LAB | Facility: CLINIC | Age: 64
End: 2024-06-21

## 2024-06-21 VITALS
BODY MASS INDEX: 26.41 KG/M2 | HEART RATE: 61 BPM | WEIGHT: 199.3 LBS | HEIGHT: 73 IN | DIASTOLIC BLOOD PRESSURE: 78 MMHG | SYSTOLIC BLOOD PRESSURE: 117 MMHG

## 2024-06-21 LAB
CHOLEST SERPL-MCNC: 143 MG/DL
FASTING STATUS PATIENT QL REPORTED: ABNORMAL
FASTING STATUS PATIENT QL REPORTED: NORMAL
GLUCOSE SERPL-MCNC: 178 MG/DL (ref 70–99)
HBA1C MFR BLD: 7 %
HDLC SERPL-MCNC: 59 MG/DL
HOLD SPECIMEN: NORMAL
INSULIN SERPL-ACNC: 9.3 UU/ML (ref 2.6–24.9)
LDLC SERPL CALC-MCNC: 67 MG/DL
NONHDLC SERPL-MCNC: 84 MG/DL
TRIGL SERPL-MCNC: 83 MG/DL

## 2024-06-21 PROCEDURE — 510N000017 HC CRU PATIENT CARE, PER 15 MIN

## 2024-06-21 PROCEDURE — 83525 ASSAY OF INSULIN: CPT | Performed by: INTERNAL MEDICINE

## 2024-06-21 PROCEDURE — 83718 ASSAY OF LIPOPROTEIN: CPT | Performed by: INTERNAL MEDICINE

## 2024-06-21 PROCEDURE — 82947 ASSAY GLUCOSE BLOOD QUANT: CPT | Performed by: INTERNAL MEDICINE

## 2024-06-21 PROCEDURE — 300N000003 HC RESEARCH SPECIMEN PROCESSING, SIMPLE

## 2024-06-21 PROCEDURE — 83036 HEMOGLOBIN GLYCOSYLATED A1C: CPT | Performed by: INTERNAL MEDICINE

## 2024-06-21 PROCEDURE — 510N000009 HC RESEARCH FACILITY, PER 15 MIN

## 2024-06-21 NOTE — ADDENDUM NOTE
Encounter addended by: Joel Michael on: 6/21/2024 1:10 PM   Actions taken: Charge Capture section accepted

## 2024-06-25 ENCOUNTER — TELEPHONE (OUTPATIENT)
Dept: ENDOCRINOLOGY | Facility: CLINIC | Age: 64
End: 2024-06-25
Payer: COMMERCIAL

## 2024-06-25 NOTE — TELEPHONE ENCOUNTER
Pt now done with [facility]    -     [facility] LABORATORY  [address] [address]  Phone: [phone]              June 25, 2024        Matt Santillan  [address] [address]        Dear Matt     Here are your lab results. YOU ARE DONE!  Your Hba1c continues to improve.  The rest of your labs including your lipid profile and your insulin levels continue to look really good.     Thank you for your participation in the [employer] 3 study. As you may be aware, the timing of your eating has been found to significantly improve health and metabolism in lab animals and may be a unique way that some humans may be able to lose weight and improve sleep and metabolism. Your participation in this study helps us understand how monitoring your diet might affect metabolism in humans and we are grateful for the time and dedication you have given to our efforts.     Please let us know if you have any questions related to the study. Also, please find attached your most recent labs as well as your previous labs for your reference.     Thank you for your consent to answer our post-study surveys. There will be 2 electronic surveys sent to your email in total: the first survey at 1 month after you finish the study, and the second survey at 3 months after you finish the study.     Again, thank you for your participation.        Warmest Regards     Jeanie Mar and the SFS3 study team              Resulted Orders   Hemoglobin A1c   Result Value Ref Range     Hemoglobin A1C 7.0 (H) <5.7 %         Comment:         Normal <5.7%   Prediabetes 5.7-6.4%    Diabetes 6.5% or higher     Note: Adopted from ADA consensus guidelines.   Lipid Profile   Result Value Ref Range     Cholesterol 143 <200 mg/dL     Triglycerides 83 <150 mg/dL     Direct Measure HDL 59 >=40 mg/dL     LDL Cholesterol Calculated 67 <=100 mg/dL     Non HDL Cholesterol 84 <130 mg/dL      Patient Fasting > 8hrs? Unknown       Narrative     Cholesterol  Desirable:  <200 mg/dL     Triglycerides  Normal:  Less than 150 mg/dL  Borderline High:  150-199 mg/dL  High:  200-499 mg/dL  Very High:  Greater than or equal to 500 mg/dL     Direct Measure HDL  Female:  Greater than or equal to 50 mg/dL   Male:  Greater than or equal to 40 mg/dL     LDL Cholesterol  Desirable:  <100mg/dL  Above Desirable:  100-129 mg/dL   Borderline High:  130-159 mg/dL   High:  160-189 mg/dL   Very High:  >= 190 mg/dL     Non HDL Cholesterol  Desirable:  130 mg/dL  Above Desirable:  130-159 mg/dL  Borderline High:  160-189 mg/dL  High:  190-219 mg/dL  Very High:  Greater than or equal to 220 mg/dL   Insulin level   Result Value Ref Range     Insulin 9.3 2.6 - 24.9 uU/mL   Glucose   Result Value Ref Range     Glucose 178 (H) 70 - 99 mg/dL     Patient Fasting > 8hrs? Unknown     Extra SST Tube (LAB USE ONLY)   Result Value Ref Range     Hold Specimen JI        Component      Latest Ref Rng 12/18/2023  7:43 AM 3/29/2024  7:40 AM   Cholesterol      <200 mg/dL 136  122    Triglycerides      <150 mg/dL 94  70    HDL Cholesterol      >=40 mg/dL 46  50    LDL Cholesterol Calculated      <=100 mg/dL 71  58    Non HDL Cholesterol      <130 mg/dL 90  72    Patient Fasting? Unknown  Unknown    Patient Fasting? Unknown  Unknown    Glucose      70 - 99 mg/dL 202 (H)  157 (H)    Hemoglobin A1C      <5.7 % 7.7 (H)  7.2 (H)    Insulin      2.6 - 24.9 uU/mL 8.9         Legend:  (H) High  Patient Name: Joey Santillan

## 2024-07-20 ENCOUNTER — TRANSFERRED RECORDS (OUTPATIENT)
Dept: MULTI SPECIALTY CLINIC | Facility: CLINIC | Age: 64
End: 2024-07-20

## 2024-07-20 LAB — RETINOPATHY: NORMAL

## 2024-07-26 DIAGNOSIS — I48.91 ATRIAL FIBRILLATION/FLUTTER (H): Primary | ICD-10-CM

## 2024-07-26 DIAGNOSIS — I48.92 ATRIAL FIBRILLATION/FLUTTER (H): Primary | ICD-10-CM

## 2024-07-26 PROCEDURE — 93248 EXT ECG>7D<15D REV&INTERPJ: CPT | Performed by: INTERNAL MEDICINE

## 2024-07-26 NOTE — RESULT ENCOUNTER NOTE
The following letter pertains to your most recent diagnostic tests:    The ZIO patch test confirmed the presence of intermittent spells of ATRIAL FIBRILLATION.  One episode lasted 90 minutes and was associated with a fast heart heart rate.      I would recommend that we obtain an ultrasound of your heart to make sure there is not a structural problem with the heart such as a leaky or sticky heart valve or dilated heart chamber that could be the underlying cause of this arrhythmia (abnormal heart rhythm).  Call [phone] to schedule your echocardiogram (ultrasound of the heart).     Also, I would recommend a consultation with a heart specialist (cardiologist / electrophysiologist) to see if anything can be done to prevent spells of atrial fibrillation in your case.  An appointment can be scheduled by calling the above number.  I would recommend Dr. Saab or Dr. Albrecht.     If you are experiencing symptoms of palpitations or racing heart rates, please inform me, because we could start a beta-blocker medication called metoprolol to slow the heart rate and prevent symptoms if that is the case.    Sincerely,    Dr. Cisneros

## 2024-08-02 DIAGNOSIS — E78.5 HYPERLIPIDEMIA LDL GOAL <100: ICD-10-CM

## 2024-08-05 RX ORDER — ROSUVASTATIN CALCIUM 5 MG/1
TABLET, COATED ORAL
Qty: 90 TABLET | Refills: 1 | Status: SHIPPED | OUTPATIENT
Start: 2024-08-05

## 2024-08-12 ENCOUNTER — MYC MEDICAL ADVICE (OUTPATIENT)
Dept: FAMILY MEDICINE | Facility: CLINIC | Age: 64
End: 2024-08-12
Payer: COMMERCIAL

## 2024-08-13 ENCOUNTER — NURSE TRIAGE (OUTPATIENT)
Dept: FAMILY MEDICINE | Facility: CLINIC | Age: 64
End: 2024-08-13
Payer: COMMERCIAL

## 2024-08-13 NOTE — TELEPHONE ENCOUNTER
Patient wants to think more about taking the paxlovid. He will call back if he wants it. Symptoms started on Friday. He is aware he will need to call back by then.      Matt [facility] (supporting Narciso Cisneros MD)13 hours ago (7:06 PM)     RO  Tested positive for COVID this afternoon.  Can you prescribe Paxlovid ?          Reason for Disposition   COVID-19 diagnosed by positive lab test (e.g., PCR, rapid self-test kit) and mild symptoms (e.g., cough, fever, others) and no complications or SOB    Additional Information   Negative: SEVERE difficulty breathing (e.g., struggling for each breath, speaks in single words)   Negative: Difficult to awaken or acting confused (e.g., disoriented, slurred speech)   Negative: Bluish (or gray) lips or face now   Negative: Shock suspected (e.g., cold/pale/clammy skin, too weak to stand, low BP, rapid pulse)   Negative: Sounds like a life-threatening emergency to the triager   Negative: Diagnosed or suspected COVID-19 and symptoms lasting 3 or more weeks   Negative: COVID-19 exposure and no symptoms   Negative: COVID-19 vaccine reaction suspected (e.g., fever, headache, muscle aches) occurring 1 to 3 days after getting vaccine   Negative: COVID-19 vaccine, questions about   Negative: Lives with someone known to have influenza (flu test positive) and flu-like symptoms (e.g., cough, runny nose, sore throat, SOB; with or without fever)   Negative: Possible COVID-19 symptoms and triager concerned about severity of symptoms or other causes   Negative: COVID-19 and breastfeeding, questions about   Negative: SEVERE or constant chest pain or pressure  (Exception: Mild central chest pain, present only when coughing.)   Negative: MODERATE difficulty breathing (e.g., speaks in phrases, SOB even at rest, pulse 100-120)   Negative: Headache and stiff neck (can't touch chin to chest)   Negative: Oxygen level (e.g., pulse oximetry) 90% or lower   Negative:  "Chest pain or pressure  (Exception: MILD central chest pain, present only when coughing.)   Negative: Drinking very little and dehydration suspected (e.g., no urine > 12 hours, very dry mouth, very lightheaded)   Negative: Patient sounds very sick or weak to the triager   Negative: MILD difficulty breathing (e.g., minimal/no SOB at rest, SOB with walking, pulse <100)   Negative: Fever > 103 F (39.4 C)   Negative: Fever > 101 F (38.3 C) and over 60 years of age   Negative: Fever > 100.0 F (37.8 C) and bedridden (e.g., CVA, chronic illness, recovering from surgery)   Negative: HIGH RISK patient (e.g., weak immune system, age > 64 years, obesity with BMI of 30 or higher, pregnant, chronic lung disease or other chronic medical condition) and COVID symptoms (e.g., cough, fever)  (Exceptions: Already seen by doctor or NP/PA and no new or worsening symptoms.)   Negative: HIGH RISK patient and influenza is widespread in the community and ONE OR MORE respiratory symptoms: cough, sore throat, runny or stuffy nose   Negative: HIGH RISK patient and influenza exposure within the last 7 days and ONE OR MORE respiratory symptoms: cough, sore throat, runny or stuffy nose   Negative: Oxygen level (e.g., pulse oximetry) 91 to 94%   Negative: COVID-19 infection suspected by caller or triager and mild symptoms (cough, fever, or others) and negative COVID-19 rapid test   Negative: Fever present > 3 days (72 hours)   Negative: Fever returns after gone for over 24 hours and symptoms worse or not improved   Negative: Continuous (nonstop) coughing interferes with work or school and no improvement using cough treatment per Care Advice   Negative: Cough present > 3 weeks   Negative: COVID-19 diagnosed by positive lab test (e.g., PCR, rapid self-test kit) and NO symptoms (e.g., cough, fever, others)    Answer Assessment - Initial Assessment Questions  1. COVID-19 DIAGNOSIS: \"How do you know that you have COVID?\" (e.g., positive lab test or " "self-test, diagnosed by doctor or NP/PA, symptoms after exposure).      08/12/24  2. COVID-19 EXPOSURE: \"Was there any known exposure to COVID before the symptoms began?\" CDC Definition of close contact: within 6 feet (2 meters) for a total of 15 minutes or more over a 24-hour period.       My wife had it two weeks ago  3. ONSET: \"When did the COVID-19 symptoms start?\"       They started 08/12/24  4. WORST SYMPTOM: \"What is your worst symptom?\" (e.g., cough, fever, shortness of breath, muscle aches)      Nasal congestion  5. COUGH: \"Do you have a cough?\" If Yes, ask: \"How bad is the cough?\"        no  6. FEVER: \"Do you have a fever?\" If Yes, ask: \"What is your temperature, how was it measured, and when did it start?\"      Hot and cold yesterday  7. RESPIRATORY STATUS: \"Describe your breathing?\" (e.g., normal; shortness of breath, wheezing, unable to speak)       no  8. BETTER-SAME-WORSE: \"Are you getting better, staying the same or getting worse compared to yesterday?\"  If getting worse, ask, \"In what way?\"      A little better  9. OTHER SYMPTOMS: \"Do you have any other symptoms?\"  (e.g., chills, fatigue, headache, loss of smell or taste, muscle pain, sore throat)      Hot/cold feeling, nasal congestion  10. HIGH RISK DISEASE: \"Do you have any chronic medical problems?\" (e.g., asthma, heart or lung disease, weak immune system, obesity, etc.)        DM  11. VACCINE: \"Have you had the COVID-19 vaccine?\" If Yes, ask: \"Which one, how many shots, when did you get it?\"        Yes, 2  12. PREGNANCY: \"Is there any chance you are pregnant?\" \"When was your last menstrual period?\"        N/A  13. O2 SATURATION MONITOR:  \"Do you use an oxygen saturation monitor (pulse oximeter) at home?\" If Yes, ask \"What is your reading (oxygen level) today?\" \"What is your usual oxygen saturation reading?\" (e.g., 95%)        No    Protocols used: Coronavirus (COVID-19) Diagnosed or [id number]    "

## 2024-08-29 ENCOUNTER — HOSPITAL ENCOUNTER (OUTPATIENT)
Dept: CARDIOLOGY | Facility: CLINIC | Age: 64
Discharge: HOME | End: 2024-08-29
Attending: INTERNAL MEDICINE | Admitting: INTERNAL MEDICINE
Payer: COMMERCIAL

## 2024-08-29 DIAGNOSIS — I48.91 ATRIAL FIBRILLATION/FLUTTER (H): ICD-10-CM

## 2024-08-29 DIAGNOSIS — I48.92 ATRIAL FIBRILLATION/FLUTTER (H): ICD-10-CM

## 2024-08-29 LAB — LVEF ECHO: NORMAL

## 2024-08-29 PROCEDURE — 93306 TTE W/DOPPLER COMPLETE: CPT | Mod: 26 | Performed by: INTERNAL MEDICINE

## 2024-08-29 PROCEDURE — 93306 TTE W/DOPPLER COMPLETE: CPT

## 2024-08-29 NOTE — RESULT ENCOUNTER NOTE
The following letter pertains to your most recent diagnostic tests:    Good news!  This is a normal echocardiogram (ultrasound of the heart).  The squeeze strength of the heart is normal.  The valves are all functioning normally.  The chambers of the heart are appropriately sized.  There are no obvious causes for atrial fibrillation/flutter identified by this diagnostic test.      Sincerely,    Dr. Cisneros

## 2024-09-17 DIAGNOSIS — E11.9 TYPE 2 DIABETES MELLITUS WITHOUT COMPLICATION, WITHOUT LONG-TERM CURRENT USE OF INSULIN (H): ICD-10-CM

## 2024-11-19 SDOH — HEALTH STABILITY: PHYSICAL HEALTH: ON AVERAGE, HOW MANY MINUTES DO YOU ENGAGE IN EXERCISE AT THIS LEVEL?: 50 MIN

## 2024-11-19 SDOH — HEALTH STABILITY: PHYSICAL HEALTH: ON AVERAGE, HOW MANY DAYS PER WEEK DO YOU ENGAGE IN MODERATE TO STRENUOUS EXERCISE (LIKE A BRISK WALK)?: 5 DAYS

## 2024-11-19 ASSESSMENT — SOCIAL DETERMINANTS OF HEALTH (SDOH): HOW OFTEN DO YOU GET TOGETHER WITH FRIENDS OR RELATIVES?: TWICE A WEEK

## 2024-11-20 ENCOUNTER — OFFICE VISIT (OUTPATIENT)
Dept: FAMILY MEDICINE | Facility: CLINIC | Age: 64
End: 2024-11-20
Payer: COMMERCIAL

## 2024-11-20 VITALS
HEIGHT: 74 IN | HEART RATE: 65 BPM | SYSTOLIC BLOOD PRESSURE: 114 MMHG | RESPIRATION RATE: 16 BRPM | TEMPERATURE: 97.1 F | BODY MASS INDEX: 26.18 KG/M2 | OXYGEN SATURATION: 98 % | WEIGHT: 204 LBS | DIASTOLIC BLOOD PRESSURE: 76 MMHG

## 2024-11-20 DIAGNOSIS — Z12.5 SCREENING FOR PROSTATE CANCER: ICD-10-CM

## 2024-11-20 DIAGNOSIS — E11.9 TYPE 2 DIABETES MELLITUS WITHOUT COMPLICATION, WITHOUT LONG-TERM CURRENT USE OF INSULIN (H): ICD-10-CM

## 2024-11-20 DIAGNOSIS — Z00.00 ROUTINE GENERAL MEDICAL EXAMINATION AT A HEALTH CARE FACILITY: Primary | ICD-10-CM

## 2024-11-20 DIAGNOSIS — I48.0 PAF (PAROXYSMAL ATRIAL FIBRILLATION) (H): ICD-10-CM

## 2024-11-20 DIAGNOSIS — E78.5 HYPERLIPIDEMIA LDL GOAL <100: ICD-10-CM

## 2024-11-20 LAB
ALBUMIN SERPL BCG-MCNC: 4.4 G/DL (ref 3.5–5.2)
ALP SERPL-CCNC: 53 U/L (ref 40–150)
ALT SERPL W P-5'-P-CCNC: 19 U/L (ref 0–70)
ANION GAP SERPL CALCULATED.3IONS-SCNC: 11 MMOL/L (ref 7–15)
AST SERPL W P-5'-P-CCNC: 21 U/L (ref 0–45)
BILIRUB SERPL-MCNC: 0.6 MG/DL
BUN SERPL-MCNC: 21.1 MG/DL (ref 8–23)
CALCIUM SERPL-MCNC: 9.4 MG/DL (ref 8.8–10.4)
CHLORIDE SERPL-SCNC: 102 MMOL/L (ref 98–107)
CREAT SERPL-MCNC: 0.89 MG/DL (ref 0.67–1.17)
CREAT UR-MCNC: 210 MG/DL
EGFRCR SERPLBLD CKD-EPI 2021: >90 ML/MIN/1.73M2
ERYTHROCYTE [DISTWIDTH] IN BLOOD BY AUTOMATED COUNT: 12.1 % (ref 10–15)
EST. AVERAGE GLUCOSE BLD GHB EST-MCNC: 174 MG/DL
GLUCOSE SERPL-MCNC: 195 MG/DL (ref 70–99)
HBA1C MFR BLD: 7.7 % (ref 0–5.6)
HCO3 SERPL-SCNC: 26 MMOL/L (ref 22–29)
HCT VFR BLD AUTO: 44.2 % (ref 40–53)
HGB BLD-MCNC: 15.4 G/DL (ref 13.3–17.7)
MCH RBC QN AUTO: 31.9 PG (ref 26.5–33)
MCHC RBC AUTO-ENTMCNC: 34.8 G/DL (ref 31.5–36.5)
MCV RBC AUTO: 92 FL (ref 78–100)
MICROALBUMIN UR-MCNC: <12 MG/L
MICROALBUMIN/CREAT UR: NORMAL MG/G{CREAT}
PLATELET # BLD AUTO: 175 10E3/UL (ref 150–450)
POTASSIUM SERPL-SCNC: 4.5 MMOL/L (ref 3.4–5.3)
PROT SERPL-MCNC: 6.9 G/DL (ref 6.4–8.3)
PSA SERPL DL<=0.01 NG/ML-MCNC: 1.73 NG/ML (ref 0–4.5)
RBC # BLD AUTO: 4.83 10E6/UL (ref 4.4–5.9)
SODIUM SERPL-SCNC: 139 MMOL/L (ref 135–145)
WBC # BLD AUTO: 3.8 10E3/UL (ref 4–11)

## 2024-11-20 ASSESSMENT — PAIN SCALES - GENERAL: PAINLEVEL_OUTOF10: NO PAIN (0)

## 2024-11-20 NOTE — PROGRESS NOTES
Preventive Care Visit  [facility]  Narciso Cisneros MD, Internal Medicine  Nov 20, 2024      Assessment & Plan     Routine general medical examination at a health care facility    - Comprehensive metabolic panel; Future  - CBC with platelets; Future    PAF (paroxysmal atrial fibrillation) (H)  We had a lengthy conversation about this diagnosis   His holter showed a 90 min run of Afib  Fortunately he had no symptoms  Echo showed no structural disease and normal EF  CHADSvasc2 score = 1  Discussed stroke risk associated with atrial fibrillation  Discussed the role of anticoagulants to mitigate stroke risk  Discussed risks of anticoagulants  Discussed the fact that anticoagulants are indicated in his case due to elevated stroke risk given presence of diabetes   Discussed that after age 65 stroke risk would be higher and anticoagulants would definitely be needed  Also discussed alternatives to anticoagulants for stroke risk mitigation including WATCHMAN  He verbalized understanding of all these options and recognized stroke risk  At this point he does NOT want to start an anticoagulant  Discussed referral to cardiology for rhythm control strategy and expert opinion regarding managing stroke risk and he declined that option at this time as well  Since he has no symptoms, no specific intervention for the PAF was started today    - OFFICE/OUTPT VISIT,FARHAD HOLLOWAY IV    Type 2 diabetes mellitus without complication, without long-term current use of insulin (H)  Recheck labs; encouraged healthy lifestyle   - Albumin Random Urine Quantitative with Creat Ratio; Future  - HEMOGLOBIN A1C; Future  - FOOT EXAM  - OFFICE/OUTPT VISIT,EST,LEVL IV    Hyperlipidemia LDL goal <100  LDL was at goal in June, continue current Crestor (rosuvastatin)   - OFFICE/OUTPT VISIT,FARHAD HOLLOWAY IV    Screening for prostate cancer    - PROSTATE SPEC ANTIGEN SCREEN; Future    Patient has been advised of split billing requirements and  "indicates understanding: Yes        Nicotine/Tobacco Cessation  He reports that he has been smoking cigarettes and cigars. He has never used smokeless tobacco.  Nicotine/Tobacco Cessation Plan  Self help information given to patient      BMI  Estimated body mass index is 26.49 kg/m  as calculated from the following:    Height as of this encounter: 1.869 m (6' 1.58\").    Weight as of this encounter: 92.5 kg (204 lb).   Weight management plan: Discussed healthy diet and exercise guidelines    Counseling  Appropriate preventive services were addressed with this patient via screening, questionnaire, or discussion as appropriate for fall prevention, nutrition, physical activity, Tobacco-use cessation, social engagement, weight loss and cognition.  Checklist reviewing preventive services available has been given to the patient.  Reviewed patient's diet, addressing concerns and/or questions.   The patient was instructed to see the dentist every 6 months.   -he declined PCV 20, flu, covid shots today; he is thinking about getting recommended shingrix at pharmacy     FUTURE APPOINTMENTS:       - 6 months or sooner as needed     Subjective   Matt is a 64 year old, presenting for the following:  Physical (fasting)       Via the Health Maintenance questionnaire, the patient has reported the following services have been completed -Eye Exam: [facility] eye 2024-07-20, this information has been sent to the abstraction team.    HPI    Wt Readings from Last 4 Encounters:   11/20/24 92.5 kg (204 lb)   06/21/24 90.4 kg (199 lb 4.7 oz)   11/15/23 94.8 kg (209 lb)   10/21/22 93 kg (205 lb)          Health Care Directive  Patient does not have a Health Care Directive:       11/19/2024   General Health   How would you rate your overall physical health? Good   Feel stress (tense, anxious, or unable to sleep) Not at all            11/19/2024   Nutrition   Three or more servings of calcium each day? Yes   Diet: Low fat/cholesterol    Diabetic "    Carbohydrate counting   How many servings of fruit and vegetables per day? (!) 2-3   How many sweetened beverages each day? 0-1       Multiple values from one day are sorted in reverse-chronological order         11/19/2024   Exercise   Days per week of moderate/strenous exercise 5 days   Average minutes spent exercising at this level 50 min            11/19/2024   Social Factors   Frequency of gathering with friends or relatives Twice a week   Worry food won't last until get money to buy more No   Food not last or not have enough money for food? No   Do you have housing? (Housing is defined as stable permanent housing and does not include staying ouside in a car, in a tent, in an abandoned building, in an overnight shelter, or couch-surfing.) Yes   Are you worried about losing your housing? No   Lack of transportation? No   Unable to get utilities (heat,electricity)? No            11/19/2024   Fall Risk   Fallen 2 or more times in the past year? No    Trouble with walking or balance? No        Patient-reported          11/19/2024   Dental   Dentist two times every year? (!) NO            11/19/2024   TB Screening   Were you born outside of the [place]? No            Today's PHQ-2 Score:       11/19/2024     7:50 PM   PHQ-2 ( 1999 [employer])   Q1: Little interest or pleasure in doing things 0    Q2: Feeling down, depressed or hopeless 0    PHQ-2 Score 0    Q1: Little interest or pleasure in doing things Not at all   Q2: Feeling down, depressed or hopeless Not at all   PHQ-2 Score 0       Patient-reported           11/19/2024   Substance Use   If I could quit smoking, I would Completely agree   I want to quit somking, worry about health affects Completely agree   Willing to make a plan to quit smoking Completely agree   Willing to cut down before quitting Completely agree   Alcohol more than 3/day or more than 7/wk No   Do you use any other substances recreationally? No        Social History     Tobacco Use    Smoking  status: Some Days     Types: Cigarettes, Cigars    Smokeless tobacco: Never    Tobacco comments:     seldom   Substance Use Topics    Alcohol use: Yes     Alcohol/week: 0.0 - 4.0 standard drinks of alcohol     Comment: very light    Drug use: No           11/19/2024   STI Screening   New sexual partner(s) since last STI/HIV test? No      Last PSA:   PSA   Date Value Ref Range Status   01/07/2021 0.97 0 - 4 ug/L Final     Comment:     Assay Method:  Chemiluminescence using Siemens Vista analyzer     Prostate Specific Antigen Screen   Date Value Ref Range Status   11/15/2023 1.11 0.00 - 4.50 ng/mL Final   07/08/2022 1.40 0.00 - 4.00 ug/L Final     ASCVD Risk   The 10-year ASCVD risk score (Benjamin HAIDER, et al., 2019) is: 15.8%    Values used to calculate the score:      Age: 64 years      Sex: Male      Is Non- : No      Diabetic: Yes      Tobacco smoker: Yes      Systolic Blood Pressure: 114 mmHg      Is BP treated: No      HDL Cholesterol: 59 mg/dL      Total Cholesterol: 143 mg/dL           Reviewed and updated as needed this visit by Provider                    Past Medical History:   Diagnosis Date    Hyperlipidemia LDL goal <100 10/23/2017    Pilonidal cyst     Type 2 diabetes mellitus without complication, without long-term current use of insulin (H) 10/23/2017     Past Surgical History:   Procedure Laterality Date    COLONOSCOPY N/A 9/10/2018    Procedure: COLONOSCOPY;  colonoscopy;  Surgeon: Mallory Rudd MD;  Location:  GI     BP Readings from Last 3 Encounters:   11/20/24 114/76   06/21/24 117/78   02/29/24 121/62    Wt Readings from Last 3 Encounters:   11/20/24 92.5 kg (204 lb)   06/21/24 90.4 kg (199 lb 4.7 oz)   11/15/23 94.8 kg (209 lb)                  Patient Active Problem List   Diagnosis    Overweight    Type 2 diabetes mellitus without complication, without long-term current use of insulin (H)    Hyperlipidemia LDL goal <100     Past Surgical History:  "  Procedure Laterality Date    COLONOSCOPY N/A 9/10/2018    Procedure: COLONOSCOPY;  colonoscopy;  Surgeon: Mallory Rudd MD;  Location:  GI       Social History     Tobacco Use    Smoking status: Some Days     Types: Cigarettes, Cigars    Smokeless tobacco: Never    Tobacco comments:     seldom   Substance Use Topics    Alcohol use: Yes     Alcohol/week: 0.0 - 4.0 standard drinks of alcohol     Comment: very light     Family History   Problem Relation Age of Onset    C.A.D. Father     Diabetes Mother     Alzheimer Disease Paternal Grandmother     Arthritis Maternal Grandmother     Diabetes Other     Cancer - colorectal No family hx of     Prostate Cancer No family hx of          Current Outpatient Medications   Medication Sig Dispense Refill    aspirin (ASPIRIN ADULT LOW STRENGTH) 81 MG PO EC tablet Take 1 tablet (81 mg) by mouth daily 90 tablet 1    metFORMIN (GLUCOPHAGE) 500 MG tablet TAKE 1 TABLET BY MOUTH TWICE A DAY WITH FOOD 180 tablet 0    rosuvastatin (CRESTOR) 5 MG tablet TAKE 1 TABLET BY MOUTH EVERY DAY 90 tablet 1       A 10 organ systems ROS is negative other than any pertinent positives or negatives previously stated.      Objective    Exam  /76 (BP Location: Left arm, Patient Position: Sitting, Cuff Size: Adult Large)   Pulse 65   Temp 97.1  F (36.2  C)   Resp 16   Ht 1.869 m (6' 1.58\")   Wt 92.5 kg (204 lb)   SpO2 98%   BMI 26.49 kg/m     Estimated body mass index is 26.49 kg/m  as calculated from the following:    Height as of this encounter: 1.869 m (6' 1.58\").    Weight as of this encounter: 92.5 kg (204 lb).    Physical Exam  GENERAL: alert and no distress  EYES: Eyes grossly normal to inspection, PERRL and conjunctivae and sclerae normal  HENT: ear canals and TM's normal, nose and mouth without ulcers or lesions  NECK: no adenopathy, no asymmetry, masses, or scars  RESP: lungs clear to auscultation - no rales, rhonchi or wheezes  CV: regular rate and rhythm, normal S1 S2, " no S3 or S4, no murmur, click or rub, no peripheral edema  ABDOMEN: soft, nontender, no hepatosplenomegaly, no masses and bowel sounds normal  MS: no gross musculoskeletal defects noted, no edema  SKIN: no suspicious lesions or rashes  NEURO: Normal strength and tone, mentation intact and speech normal  PSYCH: mentation appears normal, affect normal/bright  Diabetic foot exam: normal DP and PT pulses, no trophic changes or ulcerative lesions, and normal sensory exam        Signed Electronically by: Narciso Cisneros MD

## 2024-11-20 NOTE — RESULT ENCOUNTER NOTE
The following letter pertains to your most recent diagnostic tests:    -Liver and gallbladder tests are normal for you. (ALT,AST, Alk phos, bilirubin), kidney function is normal for you (Creatinine, GFR), Sodium is normal, Potassium is normal for you, Calcium is normal for you.    -Your prostate specific antigen (PSA) test result returned normal.     -Your complete blood counts including your hemoglobin returned normal for you.       -Your hemoglobin A1c test which averages your blood sugars over the last 3 months returned at 7.7 which is at your goal of hemoglobin A1c at least less than 8, but increased from 7.0 last check.      Bottom line: The lab results look okay except for the trend of the hemoglobin A1c.  Rather than waiting a whole 6 months to recheck this lab test, I recommend that you return to the lab for a lab appointment to recheck the hemoglobin A1c in a 3-month interval.  Between then and now, work hard on watching the carbohydrates (starches and sugars) in the diet and keeping up and active lifestyle.      Follow up: Lab appointment in 3 months to recheck the A1c      Sincerely,    Dr. Cisneros

## 2024-11-20 NOTE — PATIENT INSTRUCTIONS
Patient Education   Preventive Care Advice   This is general advice given by our system to help you stay healthy. However, your care team may have specific advice just for you. Please talk to your care team about your preventive care needs.  Nutrition  Eat 5 or more servings of fruits and vegetables each day.  Try wheat bread, brown rice and whole grain pasta (instead of white bread, rice, and pasta).  Get enough calcium and vitamin D. Check the label on foods and aim for 100% of the RDA (recommended daily allowance).  Lifestyle  Exercise at least 150 minutes each week  (30 minutes a day, 5 days a week).  Do muscle strengthening activities 2 days a week. These help control your weight and prevent disease.  No smoking.  Wear sunscreen to prevent skin cancer.  Have a dental exam and cleaning every 6 months.  Yearly exams  See your health care team every year to talk about:  Any changes in your health.  Any medicines your care team has prescribed.  Preventive care, family planning, and ways to prevent chronic diseases.  Shots (vaccines)   HPV shots (up to age 26), if you've never had them before.  Hepatitis B shots (up to age 59), if you've never had them before.  COVID-19 shot: Get this shot when it's due.  Flu shot: Get a flu shot every year.  Tetanus shot: Get a tetanus shot every 10 years.  Pneumococcal, hepatitis A, and RSV shots: Ask your care team if you need these based on your risk.  Shingles shot (for age 50 and up)  General health tests  Diabetes screening:  Starting at age 35, Get screened for diabetes at least every 3 years.  If you are younger than age 35, ask your care team if you should be screened for diabetes.  Cholesterol test: At age 39, start having a cholesterol test every 5 years, or more often if advised.  Bone density scan (DEXA): At age 50, ask your care team if you should have this scan for osteoporosis (brittle bones).  Hepatitis C: Get tested at least once in your life.  STIs (sexually  transmitted infections)  Before age 24: Ask your care team if you should be screened for STIs.  After age 24: Get screened for STIs if you're at risk. You are at risk for STIs (including HIV) if:  You are sexually active with more than one person.  You don't use condoms every time.  You or a partner was diagnosed with a sexually transmitted infection.  If you are at risk for HIV, ask about PrEP medicine to prevent HIV.  Get tested for HIV at least once in your life, whether you are at risk for HIV or not.  Cancer screening tests  Cervical cancer screening: If you have a cervix, begin getting regular cervical cancer screening tests starting at age 21.  Breast cancer scan (mammogram): If you've ever had breasts, begin having regular mammograms starting at age 40. This is a scan to check for breast cancer.  Colon cancer screening: It is important to start screening for colon cancer at age 45.  Have a colonoscopy test every 10 years (or more often if you're at risk) Or, ask your provider about stool tests like a FIT test every year or Cologuard test every 3 years.  To learn more about your testing options, visit:   .  For help making a decision, visit:   https://bit.ly/xx65943.  Prostate cancer screening test: If you have a prostate, ask your care team if a prostate cancer screening test (PSA) at age 55 is right for you.  Lung cancer screening: If you are a current or former smoker ages 50 to 80, ask your care team if ongoing lung cancer screenings are right for you.  For informational purposes only. Not to replace the advice of your health care provider. Copyright   2023 [employer]. All rights reserved. Clinically reviewed by the [facility] Transitions Program. [employer] [id number] - REV 01/24.

## 2024-11-21 NOTE — RESULT ENCOUNTER NOTE
The following letter pertains to your most recent diagnostic tests:    -Your microalbumin level which is a diabetes urine test to check for any evidence of early kidney injury from diabetes returned negative.         Sincerely,    Dr. Cisneros

## 2024-12-19 DIAGNOSIS — E11.9 TYPE 2 DIABETES MELLITUS WITHOUT COMPLICATION, WITHOUT LONG-TERM CURRENT USE OF INSULIN (H): ICD-10-CM

## 2025-03-23 ENCOUNTER — MYC MEDICAL ADVICE (OUTPATIENT)
Dept: FAMILY MEDICINE | Facility: CLINIC | Age: 65
End: 2025-03-23
Payer: COMMERCIAL

## 2025-03-23 DIAGNOSIS — I48.0 PAF (PAROXYSMAL ATRIAL FIBRILLATION) (H): Primary | ICD-10-CM

## 2025-03-25 NOTE — TELEPHONE ENCOUNTER
Routing latest [employer] message from patient to PCP.      Sofie ESCOBAR MA on 3/25/2025 at 12:48 PM

## 2025-05-08 ENCOUNTER — OFFICE VISIT (OUTPATIENT)
Dept: CARDIOLOGY | Facility: CLINIC | Age: 65
End: 2025-05-08
Payer: COMMERCIAL

## 2025-05-08 VITALS
HEIGHT: 73 IN | SYSTOLIC BLOOD PRESSURE: 117 MMHG | BODY MASS INDEX: 28.23 KG/M2 | HEART RATE: 60 BPM | WEIGHT: 213 LBS | DIASTOLIC BLOOD PRESSURE: 74 MMHG

## 2025-05-08 DIAGNOSIS — I48.0 PAF (PAROXYSMAL ATRIAL FIBRILLATION) (H): ICD-10-CM

## 2025-05-08 DIAGNOSIS — I48.91 ATRIAL FIBRILLATION/FLUTTER (H): ICD-10-CM

## 2025-05-08 DIAGNOSIS — I48.92 ATRIAL FIBRILLATION/FLUTTER (H): ICD-10-CM

## 2025-05-08 NOTE — PROGRESS NOTES
"  Electrophysiology Clinic Progress Note    Joey Santillan MRN# [id number]   YOB: 1960 Age: 64 year old     Primary cardiologist:          Assessment and Plan   Delightful 63 yo pt with DM-II who wore ZiO last summer as part of study. He spent some time in [place] and had a few more drinks than normal. Felt racing heart beats but did not report while wearing zio. Pt was noted to be in AF 2% of the time with RVR, longest 90 mins.  Normal LVEF.  Since then cont to feel palpitations but less intense, more like a panic attack about once a week triggered after eating.    It appears pt is having recurrent AF but will need to document it as symptoms are different than in the past. He may have other types of atrial arrhythmias. Will mail out 2 weeks zio. If AF then rhythm control should be pursued with either ablation or AAD.      The longitudinal plan of care of the diagnosis(es)/condition (s) as documented were addressed during this visit. Due to the added complexity in care, I will continue to support the patient in the subsequent management and with ongoing continuity of care.       Review of Systems     12-pt ROS is negative except for as noted in the HPI.          Physical Exam     Vitals: /74   Pulse 60   Ht 1.854 m (6' 1\")   Wt 96.6 kg (213 lb)   BMI 28.10 kg/m    Wt Readings from Last 10 Encounters:   05/08/25 96.6 kg (213 lb)   11/20/24 92.5 kg (204 lb)   06/21/24 90.4 kg (199 lb 4.7 oz)   11/15/23 94.8 kg (209 lb)   10/21/22 93 kg (205 lb)   10/17/22 93 kg (205 lb)   06/28/21 93 kg (205 lb 1.6 oz)   01/07/21 98.9 kg (218 lb)   02/21/20 94.8 kg (209 lb)   10/18/19 95.7 kg (211 lb)       Constitutional:  Patient is pleasant, alert, cooperative, and in NAD.  HEENT:  NCAT. PERRLA. EOM's intact.   Neck:  CVP appears normal. No carotid bruits.   Pulmonary: Normal respiratory effort. CTAB.   Cardiac: RRR, normal S1/S2, no S3/S4, no murmur or rub.   Abdomen:  Non-tender abdomen, no " hepatosplenomegaly appreciated.   Vascular: Pulses in the upper and lower extremities are 2+ and equal bilaterally.  Extremities: No edema, erythema, cyanosis or tenderness appreciated.  Skin:  No rashes or lesions appreciated.   Neurological:  No gross motor or sensory deficits.   Psych: Appropriate affect.          Data   Labs reviewed:  Recent Labs   Lab Test 06/21/24  0846 03/29/24  0740 12/18/23  0743 02/21/20  0835 07/05/19  0945   LDL 67 58 71   < > 108*   HDL 59 50 46   < > 43   NHDL 84 72 90   < > 129   CHOL 143 122 136   < > 172   TRIG 83 70 94   < > 103   TSH  --   --  2.35  --  1.90    < > = values in this interval not displayed.       Lab Results   Component Value Date    WBC 3.8 (L) 11/20/2024    WBC 5.0 01/07/2021    RBC 4.83 11/20/2024    RBC 4.66 01/07/2021    HGB 15.4 11/20/2024    HGB 15.0 01/07/2021    HCT 44.2 11/20/2024    HCT 42.4 01/07/2021    MCV 92 11/20/2024    MCV 91 01/07/2021    MCH 31.9 11/20/2024    MCH 32.2 01/07/2021    MCHC 34.8 11/20/2024    MCHC 35.4 01/07/2021    RDW 12.1 11/20/2024    RDW 12.6 01/07/2021     11/20/2024     01/07/2021       Lab Results   Component Value Date     11/20/2024     01/07/2021    POTASSIUM 4.5 11/20/2024    POTASSIUM 4.1 07/08/2022    POTASSIUM 4.0 01/07/2021    CHLORIDE 102 11/20/2024    CHLORIDE 104 07/08/2022    CHLORIDE 107 01/07/2021    CO2 26 11/20/2024    CO2 24 07/08/2022    CO2 26 01/07/2021    ANIONGAP 11 11/20/2024    ANIONGAP 9 07/08/2022    ANIONGAP 5 01/07/2021     (H) 11/20/2024     (H) 07/08/2022     (H) 01/07/2021    BUN 21.1 11/20/2024    BUN 20 07/08/2022    BUN 25 01/07/2021    CR 0.89 11/20/2024    CR 0.83 01/07/2021    GFRESTIMATED >90 11/20/2024    GFRESTIMATED >90 01/07/2021    GFRESTBLACK >90 01/07/2021    BOBBI 9.4 11/20/2024    BOBBI 9.1 01/07/2021      Lab Results   Component Value Date    AST 21 11/20/2024    AST 18 01/07/2021    ALT 19 11/20/2024    ALT 36 01/07/2021       Lab  "Results   Component Value Date    A1C 7.7 (H) 11/20/2024    A1C 7.0 (H) 06/28/2021       No results found for: \"INR\"         Problem List     Patient Active Problem List   Diagnosis    Overweight    Type 2 diabetes mellitus without complication, without long-term current use of insulin (H)    Hyperlipidemia LDL goal <100    PAF (paroxysmal atrial fibrillation) (H)            Medications     Current Outpatient Medications   Medication Sig Dispense Refill    aspirin (ASPIRIN ADULT LOW STRENGTH) 81 MG PO EC tablet Take 1 tablet (81 mg) by mouth daily 90 tablet 1    metFORMIN (GLUCOPHAGE) 500 MG tablet TAKE 1 TABLET BY MOUTH TWICE A DAY WITH FOOD 180 tablet 1    rosuvastatin (CRESTOR) 5 MG tablet TAKE ONE TABLET BY MOUTH EVERY DAY 90 tablet 0            Past Medical History     Past Medical History:   Diagnosis Date    Hyperlipidemia LDL goal <100 10/23/2017    Pilonidal cyst     Type 2 diabetes mellitus without complication, without long-term current use of insulin (H) 10/23/2017     Past Surgical History:   Procedure Laterality Date    COLONOSCOPY N/A 9/10/2018    Procedure: COLONOSCOPY;  colonoscopy;  Surgeon: Mallory Rudd MD;  Location:  GI     Family History   Problem Relation Age of Onset    C.A.D. Father     Diabetes Mother     Alzheimer Disease Paternal Grandmother     Arthritis Maternal Grandmother     Diabetes Other     Cancer - colorectal No family hx of     Prostate Cancer No family hx of      Social History     Socioeconomic History    Marital status:      Spouse name: Not on file    Number of children: Not on file    Years of education: Not on file    Highest education level: Not on file   Occupational History    Not on file   Tobacco Use    Smoking status: Some Days     Types: Cigarettes, Cigars    Smokeless tobacco: Never   Substance and Sexual Activity    Alcohol use: Yes     Alcohol/week: 0.0 - 4.0 standard drinks of alcohol     Comment: very light    Drug use: No    Sexual activity: " Yes     Partners: Female     Birth control/protection: Other   Other Topics Concern    Parent/sibling w/ CABG, MI or angioplasty before 65F 55M? No   Social History Narrative    Not on file     Social Drivers of Health     Financial Resource Strain: Low Risk  (11/19/2024)    Financial Resource Strain     Within the past 12 months, have you or your family members you live with been unable to get utilities (heat, electricity) when it was really needed?: No   Food Insecurity: Low Risk  (11/19/2024)    Food Insecurity     Within the past 12 months, did you worry that your food would run out before you got money to buy more?: No     Within the past 12 months, did the food you bought just not last and you didn t have money to get more?: No   Transportation Needs: Low Risk  (11/19/2024)    Transportation Needs     Within the past 12 months, has lack of transportation kept you from medical appointments, getting your medicines, non-medical meetings or appointments, work, or from getting things that you need?: No   Physical Activity: Sufficiently Active (11/19/2024)    Exercise Vital Sign     Days of Exercise per Week: 5 days     Minutes of Exercise per Session: 50 min   Stress: No Stress Concern Present (11/19/2024)    [employer] [place] [employer]ational [employer] - Occupational Stress Questionnaire     Feeling of Stress : Not at all   Social Connections: Unknown (11/19/2024)    Social Connection and Isolation Panel [NHANES]     Frequency of Communication with Friends and Family: Not on file     Frequency of Social Gatherings with Friends and Family: Twice a week     Attends Mor[employer] Services: Not on file     Active Member of Clubs or Organizations: Not on file     Attends Club or Organization Meetings: Not on file     Marital Status: Not on file   Interpersonal Safety: Low Risk  (11/15/2023)    Interpersonal Safety     Do you feel physically and emotionally safe where you currently live?: Yes     Within the past 12 months, have  you been hit, slapped, kicked or otherwise physically hurt by someone?: No     Within the past 12 months, have you been humiliated or emotionally abused in other ways by your partner or ex-partner?: No   Housing Stability: Low Risk  (11/19/2024)    Housing Stability     Do you have housing? : Yes     Are you worried about losing your housing?: No            Allergies   Patient has no known allergies.    Today's clinic visit entailed:  The following tests were independently interpreted by me as noted in my documentation: angelic marie  30 minutes spent by me on the date of the encounter doing chart review, history and exam, documentation and further activities per the note  Provider  Link to MDM Help Grid     The level of medical decision making during this visit was of moderate complexity.

## 2025-05-08 NOTE — LETTER
"5/8/2025    Narciso Cisneros MD  [address]  [facility] [address]    RE: Joey Santillan       Dear Colleague,     [facility] the [facility].    Electrophysiology Clinic Progress Note    Joey Santillan MRN# [id number]   YOB: 1960 Age: 64 year old     Primary cardiologist:          Assessment and Plan   Delightful 65 yo pt with DM-II who wore ZiO last summer as part of study. He spent some time in [place] and had a few more drinks than normal. Felt racing heart beats but did not report while wearing zio. Pt was noted to be in AF 2% of the time with RVR, longest 90 mins.  Normal LVEF.  Since then cont to feel palpitations but less intense, more like a panic attack about once a week triggered after eating.    It appears pt is having recurrent AF but will need to document it as symptoms are different than in the past. He may have other types of atrial arrhythmias. Will mail out 2 weeks zio. If AF then rhythm control should be pursued with either ablation or AAD.      The longitudinal plan of care of the diagnosis(es)/condition (s) as documented were addressed during this visit. Due to the added complexity in care, I will continue to support the patient in the subsequent management and with ongoing continuity of care.       Review of Systems     12-pt ROS is negative except for as noted in the HPI.          Physical Exam     Vitals: /74   Pulse 60   Ht 1.854 m (6' 1\")   Wt 96.6 kg (213 lb)   BMI 28.10 kg/m    Wt Readings from Last 10 Encounters:   05/08/25 96.6 kg (213 lb)   11/20/24 92.5 kg (204 lb)   06/21/24 90.4 kg (199 lb 4.7 oz)   11/15/23 94.8 kg (209 lb)   10/21/22 93 kg (205 lb)   10/17/22 93 kg (205 lb)   06/28/21 93 kg (205 lb 1.6 oz)   01/07/21 98.9 kg (218 lb)   02/21/20 94.8 kg (209 lb)   10/18/19 95.7 kg (211 lb)       Constitutional:  Patient is pleasant, alert, cooperative, and in NAD.  HEENT:  " NCAT. PERRLA. EOM's intact.   Neck:  CVP appears normal. No carotid bruits.   Pulmonary: Normal respiratory effort. CTAB.   Cardiac: RRR, normal S1/S2, no S3/S4, no murmur or rub.   Abdomen:  Non-tender abdomen, no hepatosplenomegaly appreciated.   Vascular: Pulses in the upper and lower extremities are 2+ and equal bilaterally.  Extremities: No edema, erythema, cyanosis or tenderness appreciated.  Skin:  No rashes or lesions appreciated.   Neurological:  No gross motor or sensory deficits.   Psych: Appropriate affect.          Data   Labs reviewed:  Recent Labs   Lab Test 06/21/24  0846 03/29/24  0740 12/18/23  0743 02/21/20  0835 07/05/19  0945   LDL 67 58 71   < > 108*   HDL 59 50 46   < > 43   NHDL 84 72 90   < > 129   CHOL 143 122 136   < > 172   TRIG 83 70 94   < > 103   TSH  --   --  2.35  --  1.90    < > = values in this interval not displayed.       Lab Results   Component Value Date    WBC 3.8 (L) 11/20/2024    WBC 5.0 01/07/2021    RBC 4.83 11/20/2024    RBC 4.66 01/07/2021    HGB 15.4 11/20/2024    HGB 15.0 01/07/2021    HCT 44.2 11/20/2024    HCT 42.4 01/07/2021    MCV 92 11/20/2024    MCV 91 01/07/2021    MCH 31.9 11/20/2024    MCH 32.2 01/07/2021    MCHC 34.8 11/20/2024    MCHC 35.4 01/07/2021    RDW 12.1 11/20/2024    RDW 12.6 01/07/2021     11/20/2024     01/07/2021       Lab Results   Component Value Date     11/20/2024     01/07/2021    POTASSIUM 4.5 11/20/2024    POTASSIUM 4.1 07/08/2022    POTASSIUM 4.0 01/07/2021    CHLORIDE 102 11/20/2024    CHLORIDE 104 07/08/2022    CHLORIDE 107 01/07/2021    CO2 26 11/20/2024    CO2 24 07/08/2022    CO2 26 01/07/2021    ANIONGAP 11 11/20/2024    ANIONGAP 9 07/08/2022    ANIONGAP 5 01/07/2021     (H) 11/20/2024     (H) 07/08/2022     (H) 01/07/2021    BUN 21.1 11/20/2024    BUN 20 07/08/2022    BUN 25 01/07/2021    CR 0.89 11/20/2024    CR 0.83 01/07/2021    GFRESTIMATED >90 11/20/2024    GFRESTIMATED >90  "01/07/2021    GFRESTBLACK >90 01/07/2021    BOBBI 9.4 11/20/2024    BOBBI 9.1 01/07/2021      Lab Results   Component Value Date    AST 21 11/20/2024    AST 18 01/07/2021    ALT 19 11/20/2024    ALT 36 01/07/2021       Lab Results   Component Value Date    A1C 7.7 (H) 11/20/2024    A1C 7.0 (H) 06/28/2021       No results found for: \"INR\"         Problem List     Patient Active Problem List   Diagnosis     Overweight     Type 2 diabetes mellitus without complication, without long-term current use of insulin (H)     Hyperlipidemia LDL goal <100     PAF (paroxysmal atrial fibrillation) (H)            Medications     Current Outpatient Medications   Medication Sig Dispense Refill     aspirin (ASPIRIN ADULT LOW STRENGTH) 81 MG PO EC tablet Take 1 tablet (81 mg) by mouth daily 90 tablet 1     metFORMIN (GLUCOPHAGE) 500 MG tablet TAKE 1 TABLET BY MOUTH TWICE A DAY WITH FOOD 180 tablet 1     rosuvastatin (CRESTOR) 5 MG tablet TAKE ONE TABLET BY MOUTH EVERY DAY 90 tablet 0            Past Medical History     Past Medical History:   Diagnosis Date     Hyperlipidemia LDL goal <100 10/23/2017     Pilonidal cyst      Type 2 diabetes mellitus without complication, without long-term current use of insulin (H) 10/23/2017     Past Surgical History:   Procedure Laterality Date     COLONOSCOPY N/A 9/10/2018    Procedure: COLONOSCOPY;  colonoscopy;  Surgeon: Mallory Rudd MD;  Location:  GI     Family History   Problem Relation Age of Onset     C.A.D. Father      Diabetes Mother      Alzheimer Disease Paternal Grandmother      Arthritis Maternal Grandmother      Diabetes Other      Cancer - colorectal No family hx of      Prostate Cancer No family hx of      Social History     Socioeconomic History     Marital status:      Spouse name: Not on file     Number of children: Not on file     Years of education: Not on file     Highest education level: Not on file   Occupational History     Not on file   Tobacco Use     Smoking " status: Some Days     Types: Cigarettes, Cigars     Smokeless tobacco: Never   Substance and Sexual Activity     Alcohol use: Yes     Alcohol/week: 0.0 - 4.0 standard drinks of alcohol     Comment: very light     Drug use: No     Sexual activity: Yes     Partners: Female     Birth control/protection: Other   Other Topics Concern     Parent/sibling w/ CABG, MI or angioplasty before 65F 55M? No   Social History Narrative     Not on file     Social Drivers of Health     Financial Resource Strain: Low Risk  (11/19/2024)    Financial Resource Strain      Within the past 12 months, have you or your family members you live with been unable to get utilities (heat, electricity) when it was really needed?: No   Food Insecurity: Low Risk  (11/19/2024)    Food Insecurity      Within the past 12 months, did you worry that your food would run out before you got money to buy more?: No      Within the past 12 months, did the food you bought just not last and you didn t have money to get more?: No   Transportation Needs: Low Risk  (11/19/2024)    Transportation Needs      Within the past 12 months, has lack of transportation kept you from medical appointments, getting your medicines, non-medical meetings or appointments, work, or from getting things that you need?: No   Physical Activity: Sufficiently Active (11/19/2024)    Exercise Vital Sign      Days of Exercise per Week: 5 days      Minutes of Exercise per Session: 50 min   Stress: No Stress Concern Present (11/19/2024)    [employer] [place] of Occupational Health - Occupational Stress Questionnaire      Feeling of Stress : Not at all   Social Connections: Unknown (11/19/2024)    Social Connection and Isolation Panel [NHANES]      Frequency of Communication with Friends and Family: Not on file      Frequency of Social Gatherings with Friends and Family: Twice a week      Attends Zoro[employer] Services: Not on file      Active Member of Clubs or Organizations: Not on file      Attends  Club or Organization Meetings: Not on file      Marital Status: Not on file   Interpersonal Safety: Low Risk  (11/15/2023)    Interpersonal Safety      Do you feel physically and emotionally safe where you currently live?: Yes      Within the past 12 months, have you been hit, slapped, kicked or otherwise physically hurt by someone?: No      Within the past 12 months, have you been humiliated or emotionally abused in other ways by your partner or ex-partner?: No   Housing Stability: Low Risk  (11/19/2024)    Housing Stability      Do you have housing? : Yes      Are you worried about losing your housing?: No            Allergies   Patient has no known allergies.    Today's clinic visit entailed:  The following tests were independently interpreted by me as noted in my documentation: angelic marie  30 minutes spent by me on the date of the encounter doing chart review, history and exam, documentation and further activities per the note  Provider  Link to [facility] Help Grid     The level of medical decision making during this visit was of moderate complexity.     Thank you for allowing me to participate in the care of your patient.      Sincerely,     Chriss Mcintosh MD     [facility]  cc:   Narciso Cisneros MD  [address] [address]

## 2025-05-12 ENCOUNTER — OFFICE VISIT (OUTPATIENT)
Dept: URGENT CARE | Facility: URGENT CARE | Age: 65
End: 2025-05-12
Payer: COMMERCIAL

## 2025-05-12 VITALS
TEMPERATURE: 97.6 F | SYSTOLIC BLOOD PRESSURE: 113 MMHG | HEART RATE: 71 BPM | RESPIRATION RATE: 21 BRPM | DIASTOLIC BLOOD PRESSURE: 77 MMHG | OXYGEN SATURATION: 97 % | WEIGHT: 212.2 LBS | BODY MASS INDEX: 28 KG/M2

## 2025-05-12 DIAGNOSIS — L02.213 CHEST WALL ABSCESS: ICD-10-CM

## 2025-05-12 DIAGNOSIS — L72.3 SEBACEOUS CYST: Primary | ICD-10-CM

## 2025-05-12 PROCEDURE — 99203 OFFICE O/P NEW LOW 30 MIN: CPT | Performed by: NURSE PRACTITIONER

## 2025-05-12 PROCEDURE — 3074F SYST BP LT 130 MM HG: CPT | Performed by: NURSE PRACTITIONER

## 2025-05-12 PROCEDURE — 3078F DIAST BP <80 MM HG: CPT | Performed by: NURSE PRACTITIONER

## 2025-05-12 RX ORDER — SULFAMETHOXAZOLE AND TRIMETHOPRIM 800; 160 MG/1; MG/1
1 TABLET ORAL 2 TIMES DAILY
Qty: 14 TABLET | Refills: 0 | Status: SHIPPED | OUTPATIENT
Start: 2025-05-12 | End: 2025-05-19

## 2025-05-12 NOTE — PROGRESS NOTES
Assessment & Plan     1. Sebaceous cyst (Primary)      2. Chest wall abscess      Cellulitis patient instructions discussed with provider informed patient to take antibiotic as prescribed and finish full course even if symptoms improve we discussed trying yogurt with active cultures or probiotic such as Culturelle daily to help prevent diarrhea while taking antibiotic.  Instructed the patient to return to clinic with any increase in redness, swelling, drainage, bleeding, pain and/or fever or chills.  Patient verbalized understanding of the above plan.    - sulfamethoxazole-trimethoprim (BACTRIM DS) 800-160 MG tablet; Take 1 tablet by mouth 2 times daily for 7 days.  Dispense: 14 tablet; Refill: 0      PATRICIA Gonzalez [facility] DOUGLAS Gutierrez is a 64 year old male who presents to clinic today for the following health issues:  Chief Complaint   Patient presents with    Urgent Care     Cyst on R chest (breast area) x 2 wks -has discomfort/hard in middle         5/12/2025     2:58 PM   Additional Questions   Roomed by Raymundo   Accompanied by self     HPI    Patient presents to clinic with a lump on right sided chest wall states has been there approximately 2 weeks mild tenderness has increased with redness and swelling.  Denies drainage.  Patient denies fever or malaise.    Review of Systems  Constitutional, HEENT, cardiovascular, pulmonary, gi and gu systems are negative, except as otherwise noted.      Objective    /77   Pulse 71   Temp 97.6  F (36.4  C) (Tympanic)   Resp 21   Wt 96.3 kg (212 lb 3.2 oz)   SpO2 97%   BMI 28.00 kg/m    Physical Exam   GENERAL: alert and no distress  NECK: no adenopathy, no asymmetry, masses, or scars  RESP: lungs clear to auscultation - no rales, rhonchi or wheezes  CV: regular rate and rhythm, normal S1 S2, no S3 or S4, no murmur, click or rub, no peripheral edema  MS: no gross musculoskeletal defects noted, no edema  SKIN:  cellulitis and abscess sebaceous cyst right chest wall mild warmth, negative for fluctuance, drainage.  Positive for tenderness.

## 2025-05-12 NOTE — PROGRESS NOTES
Urgent Care Clinic Visit    Chief Complaint   Patient presents with    Urgent Care     Cyst on R chest (breast area) x 2 wks -has discomfort/hard in middle               5/12/2025     2:58 PM   Additional Questions   Roomed by Raymundo   Accompanied by self

## 2025-05-15 ENCOUNTER — ORDERS ONLY (AUTO-RELEASED) (OUTPATIENT)
Dept: CARDIOLOGY | Facility: CLINIC | Age: 65
End: 2025-05-15
Payer: COMMERCIAL

## 2025-05-15 DIAGNOSIS — I48.0 PAF (PAROXYSMAL ATRIAL FIBRILLATION) (H): ICD-10-CM

## 2025-05-24 ENCOUNTER — HEALTH MAINTENANCE LETTER (OUTPATIENT)
Age: 65
End: 2025-05-24

## 2025-07-06 DIAGNOSIS — E11.9 TYPE 2 DIABETES MELLITUS WITHOUT COMPLICATION, WITHOUT LONG-TERM CURRENT USE OF INSULIN (H): ICD-10-CM

## 2025-08-07 ENCOUNTER — MYC MEDICAL ADVICE (OUTPATIENT)
Dept: FAMILY MEDICINE | Facility: CLINIC | Age: 65
End: 2025-08-07
Payer: COMMERCIAL

## 2025-08-07 DIAGNOSIS — E11.9 TYPE 2 DIABETES MELLITUS WITHOUT COMPLICATION, WITHOUT LONG-TERM CURRENT USE OF INSULIN (H): ICD-10-CM

## 2025-08-07 DIAGNOSIS — E78.5 HYPERLIPIDEMIA LDL GOAL <100: Primary | ICD-10-CM

## 2025-08-14 ENCOUNTER — LAB (OUTPATIENT)
Dept: LAB | Facility: CLINIC | Age: 65
End: 2025-08-14
Payer: COMMERCIAL

## 2025-08-14 DIAGNOSIS — E78.5 HYPERLIPIDEMIA LDL GOAL <100: ICD-10-CM

## 2025-08-14 DIAGNOSIS — E11.9 TYPE 2 DIABETES MELLITUS WITHOUT COMPLICATION, WITHOUT LONG-TERM CURRENT USE OF INSULIN (H): ICD-10-CM

## 2025-08-14 LAB
CHOLEST SERPL-MCNC: 121 MG/DL
EST. AVERAGE GLUCOSE BLD GHB EST-MCNC: 183 MG/DL
FASTING STATUS PATIENT QL REPORTED: YES
HBA1C MFR BLD: 8 % (ref 0–5.6)
HDLC SERPL-MCNC: 40 MG/DL
LDLC SERPL CALC-MCNC: 62 MG/DL
NONHDLC SERPL-MCNC: 81 MG/DL
TRIGL SERPL-MCNC: 94 MG/DL

## (undated) RX ORDER — FENTANYL CITRATE 50 UG/ML
INJECTION, SOLUTION INTRAMUSCULAR; INTRAVENOUS
Status: DISPENSED
Start: 2018-09-10